# Patient Record
Sex: FEMALE | Race: ASIAN | NOT HISPANIC OR LATINO | URBAN - METROPOLITAN AREA
[De-identification: names, ages, dates, MRNs, and addresses within clinical notes are randomized per-mention and may not be internally consistent; named-entity substitution may affect disease eponyms.]

---

## 2017-05-11 ENCOUNTER — HOSPITAL ENCOUNTER (EMERGENCY)
Facility: OTHER | Age: 42
Discharge: HOME | End: 2017-05-11
Attending: EMERGENCY MEDICINE
Payer: MEDICAID

## 2017-05-11 VITALS
DIASTOLIC BLOOD PRESSURE: 97 MMHG | BODY MASS INDEX: 26.17 KG/M2 | OXYGEN SATURATION: 98 % | SYSTOLIC BLOOD PRESSURE: 150 MMHG | WEIGHT: 138.5 LBS | HEART RATE: 87 BPM | RESPIRATION RATE: 16 BRPM | TEMPERATURE: 98 F

## 2017-05-11 DIAGNOSIS — R06.02 SHORTNESS OF BREATH: ICD-10-CM

## 2017-05-11 DIAGNOSIS — R05.9 COUGH: ICD-10-CM

## 2017-05-11 DIAGNOSIS — J02.0 STREP PHARYNGITIS: ICD-10-CM

## 2017-05-11 DIAGNOSIS — J10.1 INFLUENZA B: Primary | ICD-10-CM

## 2017-05-11 LAB
BILIRUBIN, POC UA: NEGATIVE
BLOOD, POC UA: ABNORMAL
CLARITY, POC UA: CLEAR
COLOR, POC UA: YELLOW
GLUCOSE, POC UA: NEGATIVE
INFLUENZA A ANTIGEN, POC: NEGATIVE
INFLUENZA B ANTIGEN, POC: POSITIVE
KETONES, POC UA: NEGATIVE
LEUKOCYTE EST, POC UA: NEGATIVE
NITRITE, POC UA: NEGATIVE
PH UR STRIP: 7 [PH]
POC RAPID STREP A: POSITIVE
PROTEIN, POC UA: NEGATIVE
SPECIFIC GRAVITY, POC UA: 1.02
UROBILINOGEN, POC UA: 0.2 E.U./DL

## 2017-05-11 PROCEDURE — 93010 ELECTROCARDIOGRAM REPORT: CPT | Mod: ,,, | Performed by: INTERNAL MEDICINE

## 2017-05-11 PROCEDURE — 99284 EMERGENCY DEPT VISIT MOD MDM: CPT

## 2017-05-11 PROCEDURE — 93005 ELECTROCARDIOGRAM TRACING: CPT

## 2017-05-11 RX ORDER — AMOXICILLIN 500 MG/1
1000 TABLET, FILM COATED ORAL DAILY
Qty: 20 TABLET | Refills: 0 | Status: SHIPPED | OUTPATIENT
Start: 2017-05-11 | End: 2017-05-21

## 2017-05-11 RX ORDER — CODEINE PHOSPHATE AND GUAIFENESIN 10; 100 MG/5ML; MG/5ML
10 SOLUTION ORAL EVERY 4 HOURS PRN
Qty: 118 ML | Refills: 0 | Status: SHIPPED | OUTPATIENT
Start: 2017-05-11 | End: 2017-05-16

## 2017-05-11 NOTE — ED AVS SNAPSHOT
[facility] EMERGENCY DEPARTMENT  [address] [facility] [address]               Danya Garcia   2017  9:13 PM   ED    Description:  Female : 1975   Department:  [facility]           Your Care was Coordinated By:     Provider Role From To    Mariela Montelongo MD Attending Provider [phone] --      Reason for Visit     Shortness of Breath     Cough     Fever           Diagnoses this Visit        Comments    Influenza B    -  Primary     Shortness of breath         Cough         Strep pharyngitis           ED Disposition     ED Disposition Condition Comment    Discharge             To Do List           Follow-up Information     Follow up with Mehul Martinez MD In 3 days.    Specialty:  Pediatrics    Why:  For recheck if symptoms fail to improve or resolve    Contact information:    [address]  [place] [id number] [phone]         These Medications        Disp Refills Start End    amoxicillin (AMOXIL) 500 MG Tab 20 tablet 0 2017    Take 2 tablets (1,000 mg total) by mouth once daily. - Oral    Pharmacy: [address] Ph #: [phone]       guaifenesin-codeine 100-10 mg/5 ml (CHERATUSSIN AC)  mg/5 mL syrup 118 mL 0 2017    Take 10 mLs by mouth every 4 (four) hours as needed for Cough. - Oral    [address] #: [phone]         [facility]     [facility] Nurse Care Line - 24/7 Assistance  Unless otherwise directed by your provider, please contact [facility], our nurse care line that is available for 24/7 assistance.     Registered nurses in the [facility] provide: appointment scheduling, clinical advisement, health education, and other advisory services.  Call: [phone] (toll free)               Medications           Message regarding Medications      Verify the changes and/or additions to your medication regime listed below are the same as discussed with your clinician today.  If any of these changes or additions are incorrect, please notify your healthcare provider.        START taking these NEW medications        Refills    amoxicillin (AMOXIL) 500 MG Tab 0    Sig: Take 2 tablets (1,000 mg total) by mouth once daily.    Class: Print    Route: Oral    guaifenesin-codeine 100-10 mg/5 ml (CHERATUSSIN AC)  mg/5 mL syrup 0    Sig: Take 10 mLs by mouth every 4 (four) hours as needed for Cough.    Class: Print    Route: Oral      STOP taking these medications     hydrocodone-acetaminophen 5-325mg (NORCO) 5-325 mg per tablet Take 1 tablet by mouth every 6 (six) hours as needed for Pain.           Verify that the below list of medications is an accurate representation of the medications you are currently taking.  If none reported, the list may be blank. If incorrect, please contact your healthcare provider. Carry this list with you in case of emergency.           Current Medications     amoxicillin (AMOXIL) 500 MG Tab Take 2 tablets (1,000 mg total) by mouth once daily.    guaifenesin-codeine 100-10 mg/5 ml (CHERATUSSIN AC)  mg/5 mL syrup Take 10 mLs by mouth every 4 (four) hours as needed for Cough.    ibuprofen (ADVIL,MOTRIN) 200 MG tablet Take 200 mg by mouth every 6 (six) hours as needed for Pain.    ibuprofen (ADVIL,MOTRIN) 200 MG tablet Take 2 tablets (400 mg total) by mouth every 6 (six) hours as needed for Pain.           Clinical Reference Information           Your Vitals Were     BP Pulse Temp Resp Weight Last Period    150/97 (BP Location: Right arm, Patient Position: Sitting) 87 97.5 °F (36.4 °C) (Temporal) 16 62.8 kg (138 lb 8 oz) 05/07/2017    SpO2 BMI             98% 26.17 kg/m2         Allergies as of 5/11/2017        Reactions    Aleve [Naproxen Sodium] Hives    Shellfish Containing Products Rash      Immunizations Administered on Date  of Encounter - 5/11/2017     None      ED Micro, Lab, POCT     Start Ordered       Status Ordering Provider    05/11/17 2150 05/11/17 2150  POCT Influenza A/B  Once      Final result     05/11/17 2145 05/11/17 2145  POCT URINALYSIS W/O SCOPE  Once      Final result     05/11/17 2140 05/11/17 2140  POCT Rapid Strep A  Once      Final result     05/11/17 2127 05/11/17 2126  POCT Rapid Strep A  Once      Completed     05/11/17 2127 05/11/17 2126  POCT URINALYSIS W/O SCOPE  Once      Completed     05/11/17 2127 05/11/17 2126  POCT Influenza A/B  Once      Completed       ED Imaging Orders     Start Ordered       Status Ordering Provider    05/11/17 2126 05/11/17 2126  X-Ray Chest PA And Lateral  1 time imaging      Final result         Discharge Instructions         Tylenol or ibuprofen over-the-counter per package as needed for fever or aches.  Increase oral liquids for several days.  Amoxicillin 500 mg 2 tablets by mouth daily for 10 days.  Cheratussin AC 10 mL (2 teaspoons) by mouth every 6 hours as needed for cough.  Frequent handwashing for all household members.  Follow-up with Dr. Martinez if not improving in 3 days.     Viêm h?ng: Malorie?m C?u Khu?n (Xác Nh?n)     Quý v? ???c th? katie?m cho th?y c? h?ng b? malorie?m c?u khu?n. C? h?ng b? malorie?m c?u khu?n là m?t c?n b?nh hay lây. C?n b?nh này lây ena althea vi?c ho, hôn hít ho?c ??ng ch?m nh?ng ng??i khác jessica khi s? vào mi?ng ho?c m?i c?a quý v?. Các tri?u ch?ng rachana g?m ?au c? h?ng b? tr?m tr?ng h?n khi nu?t, ?au toàn thân, nh?c ??u và s?t. B?nh này ???c ?i?u tr? b?ng thu?c tr? sinh. Thu?c này s? giúp cho quý v? b?t ??u ?? h?n bear vòng 1-2 ngày.  Ch?m sóc t?i katherine  · Ngh? ng?i t?i katherine. U?ng th?t malorie?u ch?t l?ng ?? tránh b? háo n??c.  · Không ?i làm ho?c ?i h?c bear 2 ngày ??u jessica khi dùng thu?c tr? sinh. Jessica th?i emmanuel này, quý v? s? không lây cho ng??i khác. Jessica ?ó quý v? có th? tr? l?i ?i h?c ho?c ?i làm n?u c?m th?y ?? h?n.   · Thu?c tr? sinh ph?i ???c dùng bear tr?n 10  ngày, m?c dù quý v? c?m th?y ?? h?n. ?i?u này r?t marychuy tr?ng ?? ??m b?o là b? rayray?m trùng ???c ?i?u tr?. ?? ng?n ng?a s? phát tri?n c?a các vi khu?n kháng thu?c c?ng là ?i?u marychuy tr?ng.  N?u quý v? ???c chích thu?c tr? sinh, s? không còn c?n thêm thu?c tr? sinh n?a.  · Quý v? có th? dùng acetaminophen (Tylenol) ho?c ibuprofen (Motrin, Advil) ?? ki?m ch? c?n ?au ho?c s?t, tr? khi m?t thu?c khác ?ã ???c kê toa cho ch?ng này. (GHI CHÚ: N?u quý v? b? b?nh dionne ho?c th?n mãn tính ho?c ?ã t?ng b? loét rachana t? ho?c ch?y máu bear ???ng tiêu hoá, hãy bàn v?i bác s? c?a quý v? tr??c khi dùng các thu?c này.)  · Các viên thu?c hình thoi ho?c thu?c x?t cho c? h?ng, (nh? Chloraseptic) giúp làm gi?m c?n ?au. Xúc h?ng b?ng n??c ?m pha mu?i c?ng s? làm gi?m ch?ng ?au c? h?ng. Pha 1/2 mu?ng cà phê mu?i bear 1 ly n??c ?m. ?i?u này có th? h?u ích ngay tr??c các b?a ?n.   · ?n th?c ph?m m?m thì c?ng ???c. Tránh th?c ?n m?n ho?c geovani.  Ch?m sóc tonya dõi  Khám tonya dõi v?i nhân viên y t? c?a quý v? ho?c nhân viên c?a chúng tôi n?u quý v? không ?? h?n bear tu?n l? k? ti?p.  Khi nào ?i tìm s? khuyên nh? v? y t?  G?i nhân viên y t? c?a mình ngay n?u b? b?t c? ?i?u này genie ?ây x?y ra:  · B? s?t tonya s? ch? d?n c?a bác s? quý v?   · M?i b? ?au marilee ho?c ?au tr?m tr?ng h?n, ?au xoang m?i, ho?c nh?c ??u  · Các kh?i u ?au ??n n?i ? genie gáy  · C? c?ng  · Các h?ch b?ch adalberto?t s?ng l?n h?n ho?c tr? nên m?m ? chính gi?a  · Không th? nu?t ???c ch?t l?ng, ch?y n??c dãi quá ??, ho?c không th? m? mi?ng r?ng do ?au c? h?ng  · Các d?u hi?u b? háo n??c (n??c ti?u r?t ??m màu ho?c không có n??c ti?u, m?t b? s?p, chóng m?t)  · Khó th? ho?c th? có ti?ng ??ng  · Ti?ng nói b? gi?m ?i  · M?i phát ban  Date Last Reviewed: 4/13/2015  © 9694-3131 [employer]. [address] [place] [address], [place] [address]. All rights reserved. This information is not intended as a substitute for professional medical care. Always follow your healthcare professional's  instructions.          Discharge References/Attachments     VIRAL SYNDROME (ADULT) (Bangladeshi)      [facility] Sign-Up     Activating your [facility] account is as easy as 1-2-3!     1) Visit [email or web address], select Sign Up Now, enter this activation code and your date of birth, then select Next.  Activation code not generated  Current Patient Portal Status: Account disabled      2) Create a username and password to use when you visit [facility] in the future and select a security question in case you lose your password and select Next.    3) Enter your e-mail address and click Sign Up!    Additional Information  If you have questions, please e-mail [email or web address] or call [phone] to talk to our [facility] staff. Remember, [facility] is NOT to be used for urgent needs. For medical emergencies, dial 911.          [facility] Emergency Department complies with applicable Federal civil rights laws and does not discriminate on the basis of race, color, national origin, age, disability, or sex.        Language Assistance Services     ATTENTION: Language assistance services are available, free of charge. Please call [phone].      ATENCIÓN: Si habla español, tiene a slaughter disposición servicios gratuitos de asistencia lingüística. Llame al [phone].     CHÚ Ý: N?u b?n nói Ti?ng Vi?t, có các d?ch v? h? tr? ngôn ng? mi?n phí dành cho b?n. G?i s? [phone].

## 2017-05-12 NOTE — DISCHARGE INSTRUCTIONS
Tylenol or ibuprofen over-the-counter per package as needed for fever or aches.  Increase oral liquids for several days.  Amoxicillin 500 mg 2 tablets by mouth daily for 10 days.  Cheratussin AC 10 mL (2 teaspoons) by mouth every 6 hours as needed for cough.  Frequent handwashing for all household members.  Follow-up with Dr. Martinez if not improving in 3 days.     Viêm h?ng: Malorie?m C?u Khu?n (Xác Nh?n)     Quý v? ???c th? katie?m cho th?y c? h?ng b? malorie?m c?u khu?n. C? h?ng b? malorie?m c?u khu?n là m?t c?n b?nh hay lây. C?n b?nh này lây ena althea vi?c ho, hôn hít ho?c ??ng ch?m nh?ng ng??i khác jessica khi s? vào mi?ng ho?c m?i c?a quý v?. Các tri?u ch?ng rachana g?m ?au c? h?ng b? tr?m tr?ng h?n khi nu?t, ?au toàn thân, nh?c ??u và s?t. B?nh này ???c ?i?u tr? b?ng thu?c tr? sinh. Thu?c này s? giúp cho quý v? b?t ??u ?? h?n bear vòng 1-2 ngày.  Ch?m sóc t?i katherine  · Ngh? ng?i t?i katherine. U?ng th?t malorie?u ch?t l?ng ?? tránh b? háo n??c.  · Không ?i làm ho?c ?i h?c bear 2 ngày ??u jessica khi dùng thu?c tr? sinh. Jessica th?i emmanuel này, quý v? s? không lây cho ng??i khác. Jessica ?ó quý v? có th? tr? l?i ?i h?c ho?c ?i làm n?u c?m th?y ?? h?n.   · Thu?c tr? sinh ph?i ???c dùng bear tr?n 10 ngày, m?c dù quý v? c?m th?y ?? h?n. ?i?u này r?t marychuy tr?ng ?? ??m b?o là b? malorie?m trùng ???c ?i?u tr?. ?? ng?n ng?a s? phát tri?n c?a các vi khu?n kháng thu?c c?ng là ?i?u marychuy tr?ng.  N?u quý v? ???c chích thu?c tr? sinh, s? không còn c?n thêm thu?c tr? sinh n?a.  · Quý v? có th? dùng acetaminophen (Tylenol) ho?c ibuprofen (Motrin, Advil) ?? ki?m ch? c?n ?au ho?c s?t, tr? khi m?t thu?c khác ?ã ???c kê toa cho ch?ng này. (GHI CHÚ: N?u quý v? b? b?nh dionne ho?c th?n mãn tính ho?c ?ã t?ng b? loét rachana t? ho?c ch?y máu bear ???ng tiêu hoá, hãy bàn v?i bác s? c?a quý v? tr??c khi dùng các thu?c này.)  · Các viên thu?c hình thoi ho?c thu?c x?t cho c? h?ng, (nh? Chloraseptic) giúp làm gi?m c?n ?au. Xúc h?ng b?ng n??c ?m pha mu?i c?ng s? làm gi?m ch?ng ?au c? h?ng. Pha  1/2 mu?ng cà phê mu?i bear 1 ly n??c ?m. ?i?u này có th? h?u ích ngay tr??c các b?a ?n.   · ?n th?c ph?m m?m thì c?ng ???c. Tránh th?c ?n m?n ho?c geovani.  Ch?m sóc tonya dõi  Khám tonya dõi v?i nhân viên y t? c?a quý v? ho?c nhân viên c?a chúng tôi n?u quý v? không ?? h?n bear tu?n l? k? ti?p.  Khi nào ?i tìm s? khuyên nh? v? y t?  G?i nhân viên y t? c?a mình ngay n?u b? b?t c? ?i?u này genie ?ây x?y ra:  · B? s?t tonya s? ch? d?n c?a bác s? quý v?   · M?i b? ?au marilee ho?c ?au tr?m tr?ng h?n, ?au xoang m?i, ho?c nh?c ??u  · Các kh?i u ?au ??n n?i ? genie gáy  · C? c?ng  · Các h?ch b?ch adalberto?t s?ng l?n h?n ho?c tr? nên m?m ? chính gi?a  · Không th? nu?t ???c ch?t l?ng, ch?y n??c dãi quá ??, ho?c không th? m? mi?ng r?ng do ?au c? h?ng  · Các d?u hi?u b? háo n??c (n??c ti?u r?t ??m màu ho?c không có n??c ti?u, m?t b? s?p, chóng m?t)  · Khó th? ho?c th? có ti?ng ??ng  · Ti?ng nói b? gi?m ?i  · M?i phát ban  Date Last Reviewed: 4/13/2015  © 5892-4320 [employer]. [address] [place] [address], [place] [address]. All rights reserved. This information is not intended as a substitute for professional medical care. Always follow your healthcare professional's instructions.

## 2017-05-12 NOTE — ED PROVIDER NOTES
Encounter Date: 5/11/2017       History     Chief Complaint   Patient presents with    Shortness of Breath     symptoms since last night, states it feels like she can't breath, took Tylenol for fever    Cough    Fever     Review of patient's allergies indicates:   Allergen Reactions    Aleve [naproxen sodium] Hives    Shellfish containing products Rash     HPI Comments: 41-year-old [place] female reports cough productive of sputum, shortness of breath, back pain, body aches and fever greater than 100° for 2 days.    Patient is a 41 y.o. female presenting with the following complaint: shortness of breath.   Shortness of Breath   This is a new problem. The problem occurs intermittently.The current episode started 2 days ago. The problem has not changed since onset.Associated symptoms include a fever, sore throat, cough and sputum production. Pertinent negatives include no headaches, no rhinorrhea, no ear pain, no neck pain, no hemoptysis, no wheezing, no PND, no chest pain, no vomiting, no abdominal pain and no leg swelling.     No past medical history on file.  Past Surgical History:   Procedure Laterality Date    HEMORRHOID SURGERY N/A      History reviewed. No pertinent family history.  Social History   Substance Use Topics    Smoking status: Never Smoker    Smokeless tobacco: None    Alcohol use No     Review of Systems   Constitutional: Positive for fever. Negative for chills.   HENT: Positive for congestion and sore throat. Negative for ear pain, postnasal drip and rhinorrhea.    Respiratory: Positive for cough, sputum production and shortness of breath. Negative for hemoptysis and wheezing.    Cardiovascular: Negative for chest pain, leg swelling and PND.   Gastrointestinal: Negative for abdominal pain, nausea and vomiting.   Genitourinary: Negative for dysuria and frequency.   Musculoskeletal: Positive for back pain and myalgias. Negative for neck pain.   Skin: Negative.    Neurological: Negative  for weakness and headaches.       Physical Exam   Initial Vitals   BP Pulse Resp Temp SpO2   05/11/17 2019 05/11/17 2019 05/11/17 2019 05/11/17 2019 05/11/17 2019   150/97 87 16 97.5 °F (36.4 °C) 98 %     Physical Exam    Vitals reviewed.  Constitutional: She appears well-developed and well-nourished. She is cooperative.   HENT:   Head: Normocephalic and atraumatic.   Nose: No mucosal edema or rhinorrhea.   Mouth/Throat: Mucous membranes are normal. Posterior oropharyngeal erythema present. No oropharyngeal exudate.   Eyes: Conjunctivae and lids are normal.   Neck: Phonation normal. Neck supple. No spinous process tenderness and no muscular tenderness present.   Cardiovascular: Normal rate, regular rhythm and normal heart sounds.   Pulmonary/Chest: Effort normal and breath sounds normal.   Abdominal: Soft. Bowel sounds are normal. There is no tenderness.   Musculoskeletal:        Cervical back: She exhibits no tenderness and no bony tenderness.        Lumbar back: She exhibits no tenderness and no bony tenderness.        Back:    Neurological: She is alert and oriented to person, place, and time. Gait normal.   Skin: Skin is warm and dry.   Multiple round, ecchymotic areas to back consistent with patient reports of cupping         ED Course   Procedures  Labs Reviewed   POCT URINALYSIS W/O SCOPE   POCT STREP A, RAPID   POCT RAPID INFLUENZA A/B             Medical Decision Making:   Initial Assessment:   Cough, sore throat, body aches, myalgias  Differential Diagnosis:   Influenza, strep pharyngitis, viral upper respiratory infection, UTI  Clinical Tests:   Lab Tests: Ordered and Reviewed  The following lab test(s) were unremarkable: Urinalysis       <> Summary of Lab: Urinalysis showed trace intact blood but was otherwise normal  Rapid strep was positive.  Influenza screen was positive for influenza B    CXR:  FINDINGS:  The PA and lateral chest radiographs shows normal lung volumes without interstitial or    airspace opacities, pleural effusions or pneumothorax.     The heart size and pulmonary vasculature are normal. The trachea is midline. There are no   clinically significant osseous abnormalities noted.     IMPRESSION:  No chest radiographic evidence of acute cardiopulmonary abnormality.     SL: 24 Signed by: Ricardo Hernandez MD  2017-05-11 21:42:38  ED Management:  We will treat patient with amoxicillin for her strep pharyngitis, Cheratussin AC for cough, and recommend Tylenol or ibuprofen (which she is known tolerate) for pain and fever.  Results and plan of care discussed with patient, who voiced understanding.                   ED Course     Clinical Impression:   The primary encounter diagnosis was Influenza B. Diagnoses of Shortness of breath, Cough, and Strep pharyngitis were also pertinent to this visit.    Disposition:   Disposition: Discharged  Condition: Stable       Mariela Montelongo MD  05/11/17 0605

## 2017-09-22 ENCOUNTER — OFFICE VISIT (OUTPATIENT)
Dept: URGENT CARE | Facility: CLINIC | Age: 42
End: 2017-09-22
Payer: MEDICAID

## 2017-09-22 VITALS
BODY MASS INDEX: 24.55 KG/M2 | WEIGHT: 130 LBS | OXYGEN SATURATION: 97 % | SYSTOLIC BLOOD PRESSURE: 129 MMHG | TEMPERATURE: 100 F | RESPIRATION RATE: 16 BRPM | DIASTOLIC BLOOD PRESSURE: 82 MMHG | HEART RATE: 72 BPM | HEIGHT: 61 IN

## 2017-09-22 DIAGNOSIS — H11.31 SUBCONJUNCTIVAL HEMORRHAGE OF RIGHT EYE: ICD-10-CM

## 2017-09-22 DIAGNOSIS — K52.9 GASTROENTERITIS: ICD-10-CM

## 2017-09-22 DIAGNOSIS — R11.2 NAUSEA AND VOMITING, INTRACTABILITY OF VOMITING NOT SPECIFIED, UNSPECIFIED VOMITING TYPE: Primary | ICD-10-CM

## 2017-09-22 PROCEDURE — 3008F BODY MASS INDEX DOCD: CPT | Mod: S$GLB,,,

## 2017-09-22 PROCEDURE — 99214 OFFICE O/P EST MOD 30 MIN: CPT | Mod: S$GLB,,,

## 2017-09-22 RX ORDER — PROMETHAZINE HYDROCHLORIDE 25 MG/1
25 TABLET ORAL EVERY 4 HOURS
Qty: 15 TABLET | Refills: 0 | Status: SHIPPED | OUTPATIENT
Start: 2017-09-22

## 2017-09-22 RX ORDER — IBUPROFEN 800 MG/1
800 TABLET ORAL 3 TIMES DAILY
COMMUNITY
End: 2018-05-03 | Stop reason: DRUGHIGH

## 2017-09-22 NOTE — PROGRESS NOTES
"Subjective:       Patient ID: Danya Garcia is a 41 y.o. female.    Vitals:  height is 5' 1" (1.549 m) and weight is 59 kg (130 lb). Her tympanic temperature is 99.5 °F (37.5 °C). Her blood pressure is 129/82 and her pulse is 72. Her respiration is 16 and oxygen saturation is 97%.     Chief Complaint: Emesis    Emesis    This is a new problem. The current episode started yesterday. The problem occurs 2 to 4 times per day. The problem has been gradually worsening. The emesis has an appearance of stomach contents. There has been no fever. Associated symptoms include abdominal pain and headaches. Pertinent negatives include no chest pain, chills, diarrhea or fever. Risk factors include ill contacts. She has tried nothing for the symptoms.   Patient had vomited 4 times last pm.  Weak, Has generalized headaches.  Also myalgias.    Review of Systems   Constitution: Negative for chills and fever.   HENT: Negative for sore throat.    Eyes: Positive for blurred vision (right eye) and pain (right eye).   Cardiovascular: Negative for chest pain.   Respiratory: Negative for shortness of breath.    Skin: Negative for rash.   Musculoskeletal: Negative for back pain and joint pain.   Gastrointestinal: Positive for abdominal pain and vomiting. Negative for diarrhea and nausea.   Neurological: Positive for headaches.   Psychiatric/Behavioral: The patient is not nervous/anxious.        Objective:      Physical Exam   Constitutional: She is oriented to person, place, and time. She appears well-developed and well-nourished. No distress (Patient is a little droopy appearing.  ).   HENT:   Mouth/Throat: Oropharynx is clear and moist.   Eyes:   Right lateral conjunctiva has a hemorrhage.  Vis Acuity is normal.    Abdominal: Soft. Bowel sounds are normal. She exhibits no distension. There is tenderness (Minimal tenderness lower quadrants bilaterally. ). There is no rebound.   Neurological: She is alert and oriented to person, place, and " time.   Vitals reviewed.      Assessment:       1.  Gastroenteritis  2.  Nausea and vomiting  3.  Right subconjunctival hemorrhage.    Plan:         There are no diagnoses linked to this encounter.   Phenergan 25 mg tabs q 6h.  Clear fluids.  Reassured re; right eye.   No intervention needed.

## 2017-09-22 NOTE — PATIENT INSTRUCTIONS
"I encourage a lot of clear liquids for the next day or more.     Nôn m?a và tiêu ch?y, không c? th? (Ng??i l?n)    Nôn m?a và tiêu ch?y giúp c? th? th?i b? các ch?t có h?i. Nguyên nhân có th? là do vi-rút ("cúm rachana t? (stomach flue)") ho?c vi khu?n (ng? ??c th?c ph?m) D? ?ng th?c ph?m ho?c thu?c c?ng có th? gây tri?u ch?ng nôn m?a ho?c tiêu ch?y. Th?nh tho?ng nguyên nhân c?ng có th? là do c?ng th?ng ho?c lo l?ng (b?n ch?n) rayray?u. Th??ng khó ch? rõ ???c nguyên nhân chính xác, k? c? genie khi ki?m tra.  Nôn m?a và tiêu ch?y th??ng h?t bear m?t karena ngày mà không gây ra v?n ?? gì. Nh?ng còn ti?p t?c, chúng có th? d?n ??n m?t n??c (m?t l??ng ch?t l?ng quá rayray?u). ?i?u này có th? nghiêm tr?ng n?u không ???c ?i?u tr?.  Ch?m sóc t?i katherine  Th?n tr?ng v?i thu?c.   Hãy h?i nhân viên y t? c?a quý v? tr??c khi dùng thu?c ch?ng bu?n nôn ho?c tiêu ch?y. C? th? th??ng s? d?ng tiêu ch?y và nôn m?a ?? lo?i b? các ch?t có th? ??c ho?c gây h?i. Vì th?, thu?c làm ch?m hay ng?n c?n quá trình này có th? không ???c khuy?n cáo. M?t s? lo?i thu?c bán althea qu?y có th? giúp xoa d?u c?m giác t?c b?ng. Hãy h?i nhân viên y t? xem các lo?i thu?c này có th? giúp quý v? hay không.  U?ng ho?c nh?p ch?t l?ng ?? tránh m?t n??c.   · N?u quý v? b? m?t n??c, bác s? có th? khuy?n cáo dùng dung d?ch bù n??c u?ng (còn ???c bi?t ??n nh? là ORS). Pedialyte và Rehydralyte là các th??ng hi?u thông d?ng. Có th? keyon karena lo?i thu?c này mà không c?n kê ??n t?i c?a hàng t?p ph?m và hi?u thu?c. Ch? s? d?ng ORS ?ã ???c ?i?u ch?. Không ???c c? t? t?o dung d?ch. H?n h?p mu?i, n??c và ch?t ?i?n gi?i là t?t nh?t ?? thay th? cho ch? ch?t l?ng mà quý v? ?ã m?t khi b? ?m. N?u quý v? không tìm ???c dung d?ch ORS, quý v? có th? s? d?ng Gatorade pha loãng b?ng n??c v?i l??ng b?ng nhau (m?t c?c Gatorade pha v?i m?t c?c n??c) ?? thay th?.  · Th??ng xuyên nh?p vài ng?m nh? ?? tránh tình tr?ng m?t n??c. N?u quý v? d?ng nôn m?a, quý v? có th? b? sung thêm ch? ?? ?n c?a dmitry padilla " m?t s? lo?i có th? khi?n tiêu ch?y tr?m tr?ng h?n.  · N?u ngh? t?i vi?c u?ng th? gì ?ó khi?n quý v? th?y bu?n nôn h?n, hãy ng?m m?t viên ?á.  · Tránh ?? u?ng gây d? ?ng ch?ng h?n nh? n??c ép trái cây và xô-?a. Chúng có th? khi?n tình tr?ng tiêu ch?y tr? nên t? h?n.  T? t? ava tr? l?i ch? ?? ?n bình th??ng c?a quý v?.   Khi c?m giác thèm ?n c?a quý v? tr? l?i, hãy ?n nh?ng th? mà quý v? th?y thích. Quý v? không c?n ?n các lo?i th?c ?n ??c bi?t. Ban ??u hãy tránh th?c ?n rayray?u m? ho?c s?a bò. ??ng ?n quá rayray?u m?t lúc. Quý v? có th? ???c khuyên nên tránh m?t s? lo?i th?c ?n t?i khi c?m th?y khá h?n.  R?a etelvina.  Th??ng xuyên r?a etelvina có th? giúp ng?n ng?a rayray?m trùng lây ena.  Dakota dõi  nh? ???c ch? d?n b?i bác s? ho?c nhân viên c?a randall tôi.  Tìm s? marychuy tâm y t? ngay l?p t?c  n?u b?t k? ?i?u nào genie ?ây x?y ra:  · Nôn ho?c ?i phân có máu ho?c màu ?en.  · ?au b?ng n?ng, ??u không d?t ho?c ?au b?ng có d?u hi?u x?u ?i.  · ?au ??u n?ng ho?c tê c?ng c?  · Không có kh? n?ng u?ng ???c dù ch? m?t chút ch?t l?ng bear lâu h?n 12 gi?.  · Nôn m?a kéo dài h?n 24 gi?.  · Tiêu ch?y kéo dài h?n 24 gi?.  · S?t 100,4ºF (38ºC) ho?c rduolph h?n kéo dài bear lâu h?n 48 gi? ho?c dakota ch? d?n c?a nhân viên y t? c?a quý v?  · Da h?i vàng ho?c m?t có màu tr?ng.  · Các d?u hi?u m?t n??c, ch?ng h?n nh? khô mi?ng, ?i ti?u ít (ít h?n 6 ti?ng m?t l?n) ho?c n??c ti?u có màu r?t s?m.  · C?m th?y r?t y?u, chóng m?t ho?c lâng lâng bear ??u  Date Last Reviewed: 1/3/2016  © 7018-1865 [employer]. [address] [place] [address], [place] [address]. All rights reserved. This information is not intended as a substitute for professional medical care. Always follow your healthcare professional's instructions.

## 2018-05-03 ENCOUNTER — HOSPITAL ENCOUNTER (EMERGENCY)
Facility: HOSPITAL | Age: 43
Discharge: HOME | End: 2018-05-03
Attending: EMERGENCY MEDICINE
Payer: MEDICAID

## 2018-05-03 VITALS
TEMPERATURE: 99 F | RESPIRATION RATE: 16 BRPM | HEART RATE: 91 BPM | HEIGHT: 61 IN | WEIGHT: 140 LBS | DIASTOLIC BLOOD PRESSURE: 84 MMHG | SYSTOLIC BLOOD PRESSURE: 128 MMHG | BODY MASS INDEX: 26.43 KG/M2 | OXYGEN SATURATION: 97 %

## 2018-05-03 DIAGNOSIS — M75.32 CALCIFIC TENDONITIS OF LEFT SHOULDER: Primary | ICD-10-CM

## 2018-05-03 PROCEDURE — 63600175 PHARM REV CODE 636 W HCPCS: Performed by: EMERGENCY MEDICINE

## 2018-05-03 PROCEDURE — 96372 THER/PROPH/DIAG INJ SC/IM: CPT

## 2018-05-03 PROCEDURE — 99283 EMERGENCY DEPT VISIT LOW MDM: CPT | Mod: 25

## 2018-05-03 RX ORDER — DEXAMETHASONE SODIUM PHOSPHATE 4 MG/ML
6 INJECTION, SOLUTION INTRA-ARTICULAR; INTRALESIONAL; INTRAMUSCULAR; INTRAVENOUS; SOFT TISSUE
Status: COMPLETED | OUTPATIENT
Start: 2018-05-03 | End: 2018-05-03

## 2018-05-03 RX ORDER — MELOXICAM 7.5 MG/1
7.5 TABLET ORAL DAILY
Qty: 10 TABLET | Refills: 1 | Status: SHIPPED | OUTPATIENT
Start: 2018-05-03 | End: 2018-05-13

## 2018-05-03 RX ADMIN — DEXAMETHASONE SODIUM PHOSPHATE 6 MG: 4 INJECTION, SOLUTION INTRAMUSCULAR; INTRAVENOUS at 01:05

## 2018-05-03 NOTE — ED PROVIDER NOTES
Encounter Date: 5/3/2018    SCRIBE #1 NOTE: I, Aj Gonzales, am scribing for, and in the presence of,  Dr. Stevenson. I have scribed the entire note.       History     Chief Complaint   Patient presents with    Shoulder Pain     left shoulder pain and seen at a doctor on tues but still having pain after 800mg Advil and muscle relaxer this AM      This is a 42 y.o. Female who presents with left arm pain for 2 weeks. She notes she saw her doctor for this 2 days ago, and was prescribed 800 mg ibuprofen QID and a muscle relaxer. She notes this does not help alleviate her pain. She notes her pain started in her finger and has now moved up her entire arm. She denies any recent trauma or injury. Her pain is exacerbated by movmeent. She denies any fever, rashes, numbness, or weakness. She notes 2 years ago she had similar symptoms that resolved on their own.           Review of patient's allergies indicates:   Allergen Reactions    Aleve [naproxen sodium] Hives    Shellfish containing products Rash     No past medical history on file.  Past Surgical History:   Procedure Laterality Date    HEMORRHOID SURGERY N/A      No family history on file.  Social History   Substance Use Topics    Smoking status: Never Smoker    Smokeless tobacco: Never Used    Alcohol use No     Review of Systems   Constitutional: Negative for fever.   Gastrointestinal: Negative for nausea and vomiting.   Musculoskeletal: Positive for myalgias. Negative for back pain, joint swelling and neck pain.   Skin: Negative for rash.   Neurological: Negative for weakness and numbness.       Physical Exam     Initial Vitals [05/03/18 1240]   BP Pulse Resp Temp SpO2   128/84 91 16 98.5 °F (36.9 °C) 97 %      MAP       98.67         Physical Exam    Nursing note and vitals reviewed.  Constitutional: She appears well-developed and well-nourished. She is not diaphoretic. No distress.   HENT:   Head: Normocephalic and atraumatic.   Eyes: EOM are normal. Pupils  are equal, round, and reactive to light.   Neck: Normal range of motion. Neck supple.   Cardiovascular: Normal rate, regular rhythm and normal heart sounds. Exam reveals no friction rub.    No murmur heard.  Pulmonary/Chest: Breath sounds normal. She has no wheezes. She has no rhonchi. She has no rales.   Abdominal: Soft. There is no tenderness. There is no rebound and no guarding.   Musculoskeletal: Normal range of motion. She exhibits tenderness. She exhibits no edema.   Pain on external rotation of left shoulder. TTP over bicep tendon groove. Spasm over trapezius. Radial pulse 2+.   Neurological: She is alert and oriented to person, place, and time.   Skin: Skin is warm and dry. Capillary refill takes less than 2 seconds.         ED Course   Procedures  Labs Reviewed - No data to display          Medical Decision Making:   Clinical Tests:   Radiological Study: Ordered and Reviewed            Scribe Attestation:   Scribe #1: I performed the above scribed service and the documentation accurately describes the services I performed. I attest to the accuracy of the note.    Attending Attestation:           Physician Attestation for Scribe:  Physician Attestation Statement for Scribe #1: I, Dr. Stevenson, reviewed documentation, as scribed by Aj Gonzales in my presence, and it is both accurate and complete.                    Clinical Impression:   No diagnosis found.               2:25 PM    ED Course:    Labs Reviewed - No data to display    Imaging Results          X-Ray Shoulder 2 or More Views Left (Final result)  Result time 05/03/18 13:43:18    Final result by Katharine William MD (05/03/18 13:43:18)                 Impression:      There is calcific tendinitis of the left supraspinatus tendon.      Electronically signed by: Katharine William MD  Date:    05/03/2018  Time:    13:43             Narrative:    EXAMINATION:  XR SHOULDER COMPLETE 2 OR MORE VIEWS LEFT    CLINICAL HISTORY:  pain;    TECHNIQUE:  Two or  three views of the left shoulder were performed.    COMPARISON:  None    FINDINGS:  There is calcification of the distal left supraspinatus tendon.  There is no evidence fracture or malalignment.  The adjacent soft tissues are unremarkable.                               X-Ray Cervical Spine AP Lat with Flexion Extension (Final result)  Result time 05/03/18 13:41:22    Final result by Katharine William MD (05/03/18 13:41:22)                 Impression:      There is no evidence of acute cervical spine injury.  The      Electronically signed by: Katharine William MD  Date:    05/03/2018  Time:    13:41             Narrative:    EXAMINATION:  XR CERVICAL SPINE AP LAT WITH FLEX EXTEN    CLINICAL HISTORY:  pain;    TECHNIQUE:  Three views of the cervical spine plus flexion and extension views were performed.    COMPARISON:  None    FINDINGS:  There is normal alignment of the cervical spine.  There is no overhang of the lateral masses.  The adjacent soft tissues are unremarkable.                                  Medications   dexamethasone injection 6 mg (6 mg Intramuscular Given 5/3/18 1340)       Temp:  [98.5 °F (36.9 °C)] 98.5 °F (36.9 °C)  Pulse:  [91] 91  Resp:  [16] 16  SpO2:  [97 %] 97 %  BP: (128)/(84) 128/84    REASSESSMENT:  Improved    IMP: shoulder tendinitis      PLAN: rom, nsaids, fup pcp                 Ivan Stevenson MD  05/03/18 3517

## 2018-10-13 ENCOUNTER — HOSPITAL ENCOUNTER (EMERGENCY)
Facility: HOSPITAL | Age: 43
Discharge: HOME | End: 2018-10-13
Attending: EMERGENCY MEDICINE
Payer: MEDICAID

## 2018-10-13 VITALS
WEIGHT: 140 LBS | HEIGHT: 61 IN | BODY MASS INDEX: 26.43 KG/M2 | RESPIRATION RATE: 16 BRPM | SYSTOLIC BLOOD PRESSURE: 123 MMHG | OXYGEN SATURATION: 99 % | TEMPERATURE: 98 F | HEART RATE: 86 BPM | DIASTOLIC BLOOD PRESSURE: 71 MMHG

## 2018-10-13 DIAGNOSIS — R53.83 FATIGUE, UNSPECIFIED TYPE: ICD-10-CM

## 2018-10-13 DIAGNOSIS — R07.9 CHEST PAIN: ICD-10-CM

## 2018-10-13 DIAGNOSIS — R51.9 NONINTRACTABLE HEADACHE, UNSPECIFIED CHRONICITY PATTERN, UNSPECIFIED HEADACHE TYPE: ICD-10-CM

## 2018-10-13 DIAGNOSIS — M25.512 LEFT SHOULDER PAIN, UNSPECIFIED CHRONICITY: Primary | ICD-10-CM

## 2018-10-13 LAB
ALBUMIN SERPL BCP-MCNC: 3.7 G/DL
ALP SERPL-CCNC: 56 U/L
ALT SERPL W/O P-5'-P-CCNC: 16 U/L
ANION GAP SERPL CALC-SCNC: 9 MMOL/L
AST SERPL-CCNC: 19 U/L
B-HCG UR QL: NEGATIVE
BASOPHILS # BLD AUTO: 0.08 K/UL
BASOPHILS NFR BLD: 1 %
BILIRUB SERPL-MCNC: 0.3 MG/DL
BNP SERPL-MCNC: 18 PG/ML
BUN SERPL-MCNC: 11 MG/DL
CALCIUM SERPL-MCNC: 9 MG/DL
CHLORIDE SERPL-SCNC: 104 MMOL/L
CO2 SERPL-SCNC: 29 MMOL/L
CREAT SERPL-MCNC: 0.8 MG/DL
CTP QC/QA: YES
DIFFERENTIAL METHOD: ABNORMAL
EOSINOPHIL # BLD AUTO: 0.2 K/UL
EOSINOPHIL NFR BLD: 3 %
ERYTHROCYTE [DISTWIDTH] IN BLOOD BY AUTOMATED COUNT: 12.6 %
EST. GFR  (AFRICAN AMERICAN): >60 ML/MIN/1.73 M^2
EST. GFR  (NON AFRICAN AMERICAN): >60 ML/MIN/1.73 M^2
GLUCOSE SERPL-MCNC: 132 MG/DL
HCT VFR BLD AUTO: 37.4 %
HGB BLD-MCNC: 12.5 G/DL
LYMPHOCYTES # BLD AUTO: 2.5 K/UL
LYMPHOCYTES NFR BLD: 32 %
MAGNESIUM SERPL-MCNC: 2.4 MG/DL
MCH RBC QN AUTO: 30.2 PG
MCHC RBC AUTO-ENTMCNC: 33.4 G/DL
MCV RBC AUTO: 90 FL
MONOCYTES # BLD AUTO: 0.4 K/UL
MONOCYTES NFR BLD: 5.1 %
NEUTROPHILS # BLD AUTO: 4.7 K/UL
NEUTROPHILS NFR BLD: 58.9 %
PLATELET # BLD AUTO: 371 K/UL
PMV BLD AUTO: 9.9 FL
POTASSIUM SERPL-SCNC: 4 MMOL/L
PROT SERPL-MCNC: 7.4 G/DL
RBC # BLD AUTO: 4.14 M/UL
SODIUM SERPL-SCNC: 142 MMOL/L
TROPONIN I SERPL DL<=0.01 NG/ML-MCNC: <0.006 NG/ML
TSH SERPL DL<=0.005 MIU/L-ACNC: 0.82 UIU/ML
WBC # BLD AUTO: 7.91 K/UL

## 2018-10-13 PROCEDURE — 93010 ELECTROCARDIOGRAM REPORT: CPT | Mod: ,,, | Performed by: INTERNAL MEDICINE

## 2018-10-13 PROCEDURE — 81025 URINE PREGNANCY TEST: CPT | Performed by: EMERGENCY MEDICINE

## 2018-10-13 PROCEDURE — 96361 HYDRATE IV INFUSION ADD-ON: CPT

## 2018-10-13 PROCEDURE — 84484 ASSAY OF TROPONIN QUANT: CPT

## 2018-10-13 PROCEDURE — 25000003 PHARM REV CODE 250: Performed by: NURSE PRACTITIONER

## 2018-10-13 PROCEDURE — 96372 THER/PROPH/DIAG INJ SC/IM: CPT | Mod: 59

## 2018-10-13 PROCEDURE — 63600175 PHARM REV CODE 636 W HCPCS: Performed by: EMERGENCY MEDICINE

## 2018-10-13 PROCEDURE — 93005 ELECTROCARDIOGRAM TRACING: CPT

## 2018-10-13 PROCEDURE — 84443 ASSAY THYROID STIM HORMONE: CPT

## 2018-10-13 PROCEDURE — 96374 THER/PROPH/DIAG INJ IV PUSH: CPT

## 2018-10-13 PROCEDURE — 99285 EMERGENCY DEPT VISIT HI MDM: CPT | Mod: 25

## 2018-10-13 PROCEDURE — 80053 COMPREHEN METABOLIC PANEL: CPT

## 2018-10-13 PROCEDURE — 85025 COMPLETE CBC W/AUTO DIFF WBC: CPT

## 2018-10-13 PROCEDURE — 25000003 PHARM REV CODE 250: Performed by: EMERGENCY MEDICINE

## 2018-10-13 PROCEDURE — 96375 TX/PRO/DX INJ NEW DRUG ADDON: CPT

## 2018-10-13 PROCEDURE — 83880 ASSAY OF NATRIURETIC PEPTIDE: CPT

## 2018-10-13 PROCEDURE — 83735 ASSAY OF MAGNESIUM: CPT

## 2018-10-13 RX ORDER — DIPHENHYDRAMINE HYDROCHLORIDE 50 MG/ML
25 INJECTION INTRAMUSCULAR; INTRAVENOUS
Status: COMPLETED | OUTPATIENT
Start: 2018-10-13 | End: 2018-10-13

## 2018-10-13 RX ORDER — CETIRIZINE HYDROCHLORIDE 10 MG/1
10 TABLET ORAL DAILY
COMMUNITY

## 2018-10-13 RX ORDER — ASPIRIN 325 MG
325 TABLET ORAL
Status: COMPLETED | OUTPATIENT
Start: 2018-10-13 | End: 2018-10-13

## 2018-10-13 RX ORDER — PROCHLORPERAZINE EDISYLATE 5 MG/ML
5 INJECTION INTRAMUSCULAR; INTRAVENOUS
Status: COMPLETED | OUTPATIENT
Start: 2018-10-13 | End: 2018-10-13

## 2018-10-13 RX ORDER — CYCLOBENZAPRINE HCL 10 MG
5 TABLET ORAL 3 TIMES DAILY PRN
Qty: 15 TABLET | Refills: 0 | Status: SHIPPED | OUTPATIENT
Start: 2018-10-13 | End: 2018-10-18

## 2018-10-13 RX ORDER — PAROXETINE 30 MG/1
30 TABLET, FILM COATED ORAL EVERY MORNING
COMMUNITY

## 2018-10-13 RX ORDER — DEXAMETHASONE SODIUM PHOSPHATE 4 MG/ML
4 INJECTION, SOLUTION INTRA-ARTICULAR; INTRALESIONAL; INTRAMUSCULAR; INTRAVENOUS; SOFT TISSUE
Status: COMPLETED | OUTPATIENT
Start: 2018-10-13 | End: 2018-10-13

## 2018-10-13 RX ADMIN — DEXAMETHASONE SODIUM PHOSPHATE 4 MG: 4 INJECTION, SOLUTION INTRAMUSCULAR; INTRAVENOUS at 06:10

## 2018-10-13 RX ADMIN — DIPHENHYDRAMINE HYDROCHLORIDE 25 MG: 50 INJECTION, SOLUTION INTRAMUSCULAR; INTRAVENOUS at 04:10

## 2018-10-13 RX ADMIN — ASPIRIN 325 MG ORAL TABLET 325 MG: 325 PILL ORAL at 03:10

## 2018-10-13 RX ADMIN — PROCHLORPERAZINE EDISYLATE 5 MG: 5 INJECTION INTRAMUSCULAR; INTRAVENOUS at 04:10

## 2018-10-13 RX ADMIN — SODIUM CHLORIDE 1000 ML: 0.9 INJECTION, SOLUTION INTRAVENOUS at 04:10

## 2018-10-13 NOTE — ED PROVIDER NOTES
"Encounter Date: 10/13/2018  SORT MSE:  Pt is a 42 y.o. female who presents for emergent consideration for chest pain. Pt will be moved to room when one is available, otherwise will wait in waiting room with triage nurse supervision.  Pt arrived by ambulatory. She is not in distress. Orders have been placed. ANDREW Barbosa DNP ACNP-BC 10/13/2018 3:28 PM     SCRIBE #1 NOTE: I, Bonnie Mcarthur, am scribing for, and in the presence of,  Laurie Blair MD. I have scribed the following portions of the note - Other sections scribed: HPI, ROS.       History     Chief Complaint   Patient presents with    Chest Pain     pt here for chest pain x1 week. wraps around to back.     Shoulder Pain     left shoulder pain.    Weakness     "I feel weak. I don't have energy anymore"     Headache     CC: Chest Pain, Left Shoulder Pain, Weakness, Headache    41 y/o female with no known PMHx presents to ED c/o chest pain, L shoulder pain, numbness and weakness to her R hand, dizziness, fatigue, sinus pain and frontal headache that began 2 days ago and worsened today.  Pt reports she had several vomiting episodes about one week ago that resolved on its own.  Pt states she has bilateral shoulder and arm pain when she wakes up, but that a Italian remedy resolved the pain on her R side (coining).  She states she is still having severe (8/10) to her L shoulder that worsens with palpation and movement of her L arm and that this pain extends to the upper part of her chest.  She also complains of a frontal headache today.  She has had headaches like this in the past.  She states she feels numbness and weakness to her R hand.  She also notes she "feels hot on the inside."  She states she has taken Zyrtec for her sinus symptoms without relief.  She has been taking 800 mg of ibuprofen without relief and her left shoulder pain Pt denies fever, chills, cough, abdominal pain, and leg pain.  No other symptoms reported.      The history is provided " by the patient and the spouse. No  was used.     Review of patient's allergies indicates:   Allergen Reactions    Aleve [naproxen sodium] Hives    Shellfish containing products Rash     Past Medical History:   Diagnosis Date    Depression      Past Surgical History:   Procedure Laterality Date    HEMORRHOID SURGERY N/A      History reviewed. No pertinent family history.  Social History     Tobacco Use    Smoking status: Never Smoker    Smokeless tobacco: Never Used   Substance Use Topics    Alcohol use: No    Drug use: No     Review of Systems   Constitutional: Positive for fatigue. Negative for chills and fever.   HENT: Positive for sinus pressure, sinus pain and sneezing. Negative for dental problem and facial swelling.    Eyes: Negative for pain.   Respiratory: Negative for cough and shortness of breath.    Cardiovascular: Positive for chest pain. Negative for palpitations.   Gastrointestinal: Negative for abdominal pain, diarrhea, nausea and vomiting.   Genitourinary: Negative for dysuria and urgency.   Musculoskeletal: Negative for back pain and neck pain.        (+) L shoulder pain  (+) L arm pain  (+) R lower pain pain  (+) R hand pain   Neurological: Positive for dizziness, weakness (R hand), numbness (R hand) and headaches. Negative for seizures and syncope.       Physical Exam     Initial Vitals [10/13/18 1527]   BP Pulse Resp Temp SpO2   139/77 74 18 98.1 °F (36.7 °C) 99 %      MAP       --         Physical Exam    Constitutional: She appears well-developed and well-nourished. She is not diaphoretic. No distress.   HENT:   Mouth/Throat: Oropharynx is clear and moist.   Eyes: EOM are normal. Pupils are equal, round, and reactive to light.   Neck: Neck supple.   Cardiovascular: Normal rate and regular rhythm.   Pulses:       Radial pulses are 2+ on the right side, and 2+ on the left side.   Pulmonary/Chest: Breath sounds normal. She has no wheezes. She has no rhonchi. She has no  rales. She exhibits tenderness (Tender to palpation across anterior upper chest wall on the left side.).   Abdominal: Soft. Bowel sounds are normal. There is no tenderness.   Musculoskeletal:        Left shoulder: She exhibits decreased range of motion (Pain with abduction.  Patient is able to externally and internally rotate) and tenderness. She exhibits no bony tenderness, no swelling and no deformity.   Tenderness to palpation over the left shoulder.   Neurological: She is alert and oriented to person, place, and time. She has normal strength. A sensory deficit ( isolated decreased sensation to light touch in the right hand on the thumb and pointer finger, this numbness does not extend proximally to the wrist.  Otherwise her sensation to light touch is intact in all 4 extremities.) is present. No cranial nerve deficit. GCS score is 15. GCS eye subscore is 4. GCS verbal subscore is 5. GCS motor subscore is 6.   No pronator drift, normal finger to nose testing, the no truncal ataxia, no nystagmus   Skin: Skin is warm and dry.   Psychiatric: She has a normal mood and affect.         ED Course   Procedures  Labs Reviewed   CBC W/ AUTO DIFFERENTIAL - Abnormal; Notable for the following components:       Result Value    Platelets 371 (*)     All other components within normal limits   COMPREHENSIVE METABOLIC PANEL - Abnormal; Notable for the following components:    Glucose 132 (*)     All other components within normal limits   TROPONIN I   B-TYPE NATRIURETIC PEPTIDE   TSH   MAGNESIUM   POCT URINE PREGNANCY        ECG Results          EKG 12-lead (Preliminary result)  Result time 10/13/18 18:03:10    ED Interpretation by Laurie Blair MD (10/13/18 18:03:10)    Normal sinus rhythm, rate 78 beats per minute, no ST elevation, no T-wave inversion, normal CA interval, .                            Imaging Results          CT Head Without Contrast (Final result)  Result time 10/13/18 17:29:29    Final result by  Jose Catalan MD (10/13/18 17:29:29)                 Impression:      No acute intracranial abnormalities identified.      Electronically signed by: Jose Catalan MD  Date:    10/13/2018  Time:    17:29             Narrative:    EXAMINATION:  CT HEAD WITHOUT CONTRAST    CLINICAL HISTORY:  Headache, acute, norm neuro exam;    TECHNIQUE:  Low dose axial images were obtained through the head.  Coronal and sagittal reformations were also performed. Contrast was not administered.    COMPARISON:  None.    FINDINGS:  The brain is normally formed and exhibits normal density throughout with no indication of acute/recent major vascular distribution cerebral infarction, intraparenchymal hemorrhage, or intra-axial space occupying lesion. The ventricular system is normal in size and configuration with no evidence of hydrocephalus. No effacement of the skull-base cisterns. No abnormal extra-axial fluid collections or blood products. The visualized paranasal sinuses and mastoid air cells are clear. The calvarium shows no significant abnormality.                               X-Ray Chest PA And Lateral (Final result)  Result time 10/13/18 16:26:34    Final result by Garret Araujo MD (10/13/18 16:26:34)                 Impression:      Normal radiographic appearance of the chest.      Electronically signed by: Garret Araujo MD  Date:    10/13/2018  Time:    16:26             Narrative:    EXAMINATION:  XR CHEST PA AND LATERAL    CLINICAL HISTORY:  Chest pain, unspecified    TECHNIQUE:  PA and lateral views of the chest were performed.    COMPARISON:  Chest radiograph 05/11/2017    FINDINGS:  Monitoring leads overlie the chest.  No detrimental change.The lungs are clear, with normal appearance of pulmonary vasculature and no pleural effusion or pneumothorax.    The cardiac silhouette is normal in size. The hilar and mediastinal contours are unremarkable.    Bones are intact.                                 Medical Decision  Making:   Initial Assessment:   42-year-old female presenting with multiple complaints. Her primary concern is her left shoulder pain that seems to radiate to her upper chest that is not improved with use of ibuprofen.  On chart review, I see that she has been evaluated in the past, several months ago, she had a left shoulder x-ray performed at that time that showed calcification of the left supraspinatus tendon, and was treated with a steroid shot.  With regards to her other symptoms, I have a low suspicion for acute coronary syndrome given that her pain is reproducible on exam.  With regard to the numbness in her right hand, I suspect this is related to a peripheral neuropathy as the decreased sensation does not extend proximal to the wrist.  She has otherwise a normal neurologic exam.  With regards to her headache, suspect tension headache versus sinus headache. I do not suspect subarachnoid hemorrhage, bacterial meningitis.  As this patient has multiple symptoms multiple complaints, will check labs, chest x-ray, EKG, will get CT head.  I will treat with IV fluids, Compazine, Benadryl.  Suspect left shoulder is a musculoskeletal issue, will treat with Decadron.            Scribe Attestation:   Scribe #1: I performed the above scribed service and the documentation accurately describes the services I performed. I attest to the accuracy of the note.    Attending Attestation:           Physician Attestation for Scribe:  Physician Attestation Statement for Scribe #1: I, Laurie Blair MD, reviewed documentation, as scribed by Bonnie Mcarthur in my presence, and it is both accurate and complete.                 ED Course as of Oct 14 0944   Sat Oct 13, 2018   0568 Patient reassessed, she says her headache has resolved.  She still has pain in her left shoulder.  I will treat her with an IM injection of Decadron.  Will also discharge with course of muscle relaxants as I believe her pain is musculoskeletal.  I did review  her laboratory findings, chest x-ray and CT head findings.  After reviewing all this, patient asked why she felt so fatigued all the time.  I advised her that medication we administered today can cause fatigue, to this she responded that she has been fatigued for quite some time.  I advised her to follow up with her primary care physician is I do not see any evidence of severe anemia, low thyroid, or metabolic derangement to cause her chronic fatigue.  [LH]      ED Course User Index  [LH] Laurie Blair MD     Clinical Impression:   The primary encounter diagnosis was Left shoulder pain, unspecified chronicity. Diagnoses of Chest pain, Nonintractable headache, unspecified chronicity pattern, unspecified headache type, and Fatigue, unspecified type were also pertinent to this visit.                             Laurie Blair MD  10/14/18 0902

## 2019-12-19 ENCOUNTER — HOSPITAL ENCOUNTER (EMERGENCY)
Facility: HOSPITAL | Age: 44
Discharge: HOME | End: 2019-12-19
Attending: EMERGENCY MEDICINE
Payer: MEDICAID

## 2019-12-19 VITALS
HEART RATE: 96 BPM | BODY MASS INDEX: 28.32 KG/M2 | HEIGHT: 61 IN | WEIGHT: 150 LBS | OXYGEN SATURATION: 97 % | RESPIRATION RATE: 18 BRPM | TEMPERATURE: 98 F | SYSTOLIC BLOOD PRESSURE: 145 MMHG | DIASTOLIC BLOOD PRESSURE: 92 MMHG

## 2019-12-19 DIAGNOSIS — G89.29 CHRONIC LEFT SHOULDER PAIN: Primary | ICD-10-CM

## 2019-12-19 DIAGNOSIS — M54.12 CERVICAL RADICULOPATHY: ICD-10-CM

## 2019-12-19 DIAGNOSIS — M25.512 CHRONIC LEFT SHOULDER PAIN: Primary | ICD-10-CM

## 2019-12-19 LAB
B-HCG UR QL: NEGATIVE
CTP QC/QA: YES

## 2019-12-19 PROCEDURE — 63600175 PHARM REV CODE 636 W HCPCS: Mod: ER | Performed by: EMERGENCY MEDICINE

## 2019-12-19 PROCEDURE — 96372 THER/PROPH/DIAG INJ SC/IM: CPT | Mod: ER

## 2019-12-19 PROCEDURE — 81025 URINE PREGNANCY TEST: CPT | Mod: ER | Performed by: EMERGENCY MEDICINE

## 2019-12-19 PROCEDURE — 99284 EMERGENCY DEPT VISIT MOD MDM: CPT | Mod: 25,ER

## 2019-12-19 RX ORDER — DEXAMETHASONE SODIUM PHOSPHATE 4 MG/ML
8 INJECTION, SOLUTION INTRA-ARTICULAR; INTRALESIONAL; INTRAMUSCULAR; INTRAVENOUS; SOFT TISSUE
Status: COMPLETED | OUTPATIENT
Start: 2019-12-19 | End: 2019-12-19

## 2019-12-19 RX ORDER — METHOCARBAMOL 750 MG/1
1500 TABLET, FILM COATED ORAL EVERY 6 HOURS
Qty: 24 TABLET | Refills: 0 | Status: SHIPPED | OUTPATIENT
Start: 2019-12-19 | End: 2019-12-22

## 2019-12-19 RX ORDER — KETOROLAC TROMETHAMINE 10 MG/1
10 TABLET, FILM COATED ORAL EVERY 6 HOURS PRN
Qty: 12 TABLET | Refills: 0 | Status: SHIPPED | OUTPATIENT
Start: 2019-12-19 | End: 2019-12-22

## 2019-12-19 RX ORDER — METHYLPREDNISOLONE 4 MG/1
TABLET ORAL
Qty: 1 PACKAGE | Refills: 0 | Status: SHIPPED | OUTPATIENT
Start: 2019-12-19 | End: 2020-01-09

## 2019-12-19 RX ORDER — GABAPENTIN 100 MG/1
100 CAPSULE ORAL 3 TIMES DAILY
Qty: 45 CAPSULE | Refills: 0 | Status: SHIPPED | OUTPATIENT
Start: 2019-12-19 | End: 2020-01-18

## 2019-12-19 RX ADMIN — DEXAMETHASONE SODIUM PHOSPHATE 8 MG: 4 INJECTION, SOLUTION INTRA-ARTICULAR; INTRALESIONAL; INTRAMUSCULAR; INTRAVENOUS; SOFT TISSUE at 07:12

## 2019-12-20 NOTE — ED PROVIDER NOTES
Encounter Date: 12/19/2019    SCRIBE #1 NOTE: I, Mary Mims , am scribing for, and in the presence of,  Dr. Eisenberg . I have scribed the following portions of the note - Other sections scribed: HPI, CELIA PE .       History     Chief Complaint   Patient presents with    Arm Pain     pt c/o L arm and shoulder pain x 2 months intermittedly     Danya Garcia is a 44 y.o. female who presents to the ED complaining of gradually worsening, severe left arm pain that starts in her left 1st and 2nd fingers and radiates up to her shoulder, neck, and head. Pt states that the pain initially began a few months ago as an intermittent pain but it has been constant and worsening since last night. She was seen at [facility] a few weeks ago and a x-ray was done but it as unremarkable. She was seen by her PCP last Monday who recommended therapy and 800 mg Ibuprofen. Pt states that the Ibuprofen is not working. Pt also reports that the pain is worse at night and worse with movement above her head. She works as a  and is left hand dominant. She denies swelling and redness to the area. Denies remote or recent trauma to the area.    The history is provided by the patient.     Review of patient's allergies indicates:   Allergen Reactions    Aleve [naproxen sodium] Hives    Shellfish containing products Rash     Past Medical History:   Diagnosis Date    Depression      Past Surgical History:   Procedure Laterality Date    HEMORRHOID SURGERY N/A      No family history on file.  Social History     Tobacco Use    Smoking status: Never Smoker    Smokeless tobacco: Never Used   Substance Use Topics    Alcohol use: No    Drug use: No     Review of Systems   Constitutional: Negative for fever.   Respiratory: Negative for shortness of breath.    Cardiovascular: Negative for chest pain.   Musculoskeletal: Positive for arthralgias, myalgias and neck pain. Negative for joint swelling.   Skin: Negative for color change, rash and  wound.   Neurological: Negative for weakness and numbness.   All other systems reviewed and are negative.      Physical Exam     Initial Vitals [12/19/19 1904]   BP Pulse Resp Temp SpO2   (!) 145/92 96 18 97.8 °F (36.6 °C) 97 %      MAP       --         Physical Exam    Nursing note and vitals reviewed.  Constitutional: She appears well-developed and well-nourished.   HENT:   Head: Normocephalic and atraumatic.   Eyes: Conjunctivae are normal.   Neck: Normal range of motion and phonation normal. Neck supple.   Cardiovascular: Normal rate and intact distal pulses.   Pulmonary/Chest: Effort normal. No stridor. No respiratory distress.   Musculoskeletal: Normal range of motion. She exhibits no edema.        Left shoulder: She exhibits tenderness and spasm (left shoulder). She exhibits normal range of motion (ROM reproduces pain), no bony tenderness, no swelling, no effusion, no crepitus, no deformity, no laceration, normal pulse and normal strength.   ROM above 90 degrees reproduces pain. The pain is localized to the left proximal forearm and shoulder.    Neurological: She is alert and oriented to person, place, and time.   Skin: Skin is warm, dry and intact. Capillary refill takes less than 2 seconds. No abrasion, no bruising, no ecchymosis and no rash noted. No erythema.   Psychiatric: She has a normal mood and affect. Her behavior is normal.         ED Course   Procedures  Labs Reviewed   POCT URINE PREGNANCY          Imaging Results    None          Medical Decision Making:   Clinical Tests:   Lab Tests: Ordered and Reviewed    Labs Reviewed  Admission on 12/19/2019, Discharged on 12/19/2019   Component Date Value Ref Range Status    POC Preg Test, Ur 12/19/2019 Negative  Negative Final     Acceptable 12/19/2019 Yes   Final        Imaging Reviewed    Imaging Results    None         Medications given in ED    Medications   dexamethasone injection 8 mg (8 mg Intramuscular Given 12/19/19 1943)        This document was produced by a scribe under my direction and in my presence. I agree with the content of the note and have made any necessary edits.     Sebas Eisenberg MD         Note was created using voice recognition software. Note may have occasional typographical errors that may not have been identified and edited despite good cindy initial review prior to signing.            Scribe Attestation:   Scribe #1: I performed the above scribed service and the documentation accurately describes the services I performed. I attest to the accuracy of the note.      Discharge Medications     Discharge Medication List as of 12/19/2019  7:47 PM      START taking these medications    Details   gabapentin (NEURONTIN) 100 MG capsule Take 1 capsule (100 mg total) by mouth 3 (three) times daily. Take one tab at bedtime x 3 days. Then take one tab Q12 x 3 days. Then take one tab Q8, Starting u 12/19/2019, Until Sat 1/18/2020, Normal      ketorolac (TORADOL) 10 mg tablet Take 1 tablet (10 mg total) by mouth every 6 (six) hours as needed for Pain (take with food)., Starting Th 12/19/2019, Until Sun 12/22/2019, Normal      methocarbamol (ROBAXIN) 750 MG Tab Take 2 tablets (1,500 mg total) by mouth every 6 (six) hours. for 3 days, Starting u 12/19/2019, Until Sun 12/22/2019, Normal      methylPREDNISolone (MEDROL DOSEPACK) 4 mg tablet Take as directed, Normal         CONTINUE these medications which have NOT CHANGED    Details   cetirizine (ZYRTEC) 10 MG tablet Take 10 mg by mouth once daily., Historical Med      paroxetine (PAXIL) 30 MG tablet Take 30 mg by mouth every morning., Historical Med      promethazine (PHENERGAN) 25 MG tablet Take 1 tablet (25 mg total) by mouth every 4 (four) hours., Starting Fri 9/22/2017, Normal                   Patient discharged to home in stable condition with instructions to:   1. Please take all meds as prescribed.  2. Follow-up with your primary care doctor   3. Return precautions  discussed and patient and/or family/caretaker understands to return to the emergency room for any concerns including worsening of your current symptoms, fever, chills, night sweats, worsening pain, chest pain, shortness of breath, nausea, vomiting, diarrhea, bleeding, headache, difficulty talking, visual disturbances, weakness, numbness or any other acute concerns                        Clinical Impression:     1. Chronic left shoulder pain    2. Cervical radiculopathy            Disposition:   Disposition: Discharged  Condition: Stable                     Sebas Eisenberg MD  12/19/19 7318

## 2019-12-20 NOTE — ED NOTES
Past Medical History:   Diagnosis Date    Depression      Pt presenting with co of left side pain,  from head, ear, eyem neck, shoulder, arm, down to fingers with numbness and tingling for months.  Pt states that it is getting worse.  She has seen her PCP.

## 2021-04-15 ENCOUNTER — PATIENT MESSAGE (OUTPATIENT)
Dept: RESEARCH | Facility: HOSPITAL | Age: 46
End: 2021-04-15

## 2021-07-01 ENCOUNTER — PATIENT MESSAGE (OUTPATIENT)
Dept: ADMINISTRATIVE | Facility: OTHER | Age: 46
End: 2021-07-01

## 2022-12-14 ENCOUNTER — HOSPITAL ENCOUNTER (EMERGENCY)
Facility: HOSPITAL | Age: 47
Discharge: HOME | End: 2022-12-14
Attending: EMERGENCY MEDICINE
Payer: MEDICAID

## 2022-12-14 VITALS
HEIGHT: 61 IN | DIASTOLIC BLOOD PRESSURE: 86 MMHG | WEIGHT: 145 LBS | SYSTOLIC BLOOD PRESSURE: 145 MMHG | TEMPERATURE: 99 F | HEART RATE: 72 BPM | BODY MASS INDEX: 27.38 KG/M2 | RESPIRATION RATE: 18 BRPM | OXYGEN SATURATION: 100 %

## 2022-12-14 DIAGNOSIS — J40 BRONCHITIS: Primary | ICD-10-CM

## 2022-12-14 DIAGNOSIS — R05.9 COUGH: ICD-10-CM

## 2022-12-14 LAB
CTP QC/QA: YES
INFLUENZA A ANTIGEN, POC: NEGATIVE
INFLUENZA B ANTIGEN, POC: NEGATIVE
POC RAPID STREP A: NEGATIVE
SARS-COV-2 RDRP RESP QL NAA+PROBE: NEGATIVE

## 2022-12-14 PROCEDURE — 99284 EMERGENCY DEPT VISIT MOD MDM: CPT | Mod: 25,ER

## 2022-12-14 PROCEDURE — 87804 INFLUENZA ASSAY W/OPTIC: CPT | Mod: 59,ER

## 2022-12-14 PROCEDURE — 87635 SARS-COV-2 COVID-19 AMP PRB: CPT | Mod: ER | Performed by: EMERGENCY MEDICINE

## 2022-12-14 RX ORDER — PROMETHAZINE HYDROCHLORIDE AND DEXTROMETHORPHAN HYDROBROMIDE 6.25; 15 MG/5ML; MG/5ML
5 SYRUP ORAL EVERY 4 HOURS PRN
Qty: 118 ML | Refills: 0 | Status: SHIPPED | OUTPATIENT
Start: 2022-12-14 | End: 2022-12-19

## 2022-12-14 RX ORDER — AZITHROMYCIN 250 MG/1
TABLET, FILM COATED ORAL
Qty: 6 TABLET | Refills: 0 | Status: SHIPPED | OUTPATIENT
Start: 2022-12-14 | End: 2022-12-19

## 2022-12-14 NOTE — ED PROVIDER NOTES
"Encounter Date: 12/14/2022    SCRIBE #1 NOTE: I, Cheyanne Joe, am scribing for, and in the presence of,  Breanne Meadows MD. I have scribed the following portions of the note - Other sections scribed: HPI, ROS, PE.     History     Chief Complaint   Patient presents with    URI     Pt arrived to ED GCS 15 A&O x 4 ABCs intact c/o cough congestion and fever times 4 day      Danya Garcia is a 47 y.o. female, with a PMHx of Depression, who presents to the ED for productive cough with yellow mucus and shortness of breath onset 4 days ago . Patient states most times it feels like "she can't breathe". Patient notes a fever came upon her 12/09/22 and she has been feeling sick since. Per patient and chart review she was hospitalized at [facility] from 9/27/22-9/30/22 due to similar symptoms and was diagnosed with Pneumonia. Patient has associated symptoms of headache, myalgias, and sore throat. Patient reports she attempts treatment with tylenol and Theraflu with no relief to symptoms. No other exacerbating or alleviating factors. Patient has no known drug allergies.    The history is provided by the patient.   Review of patient's allergies indicates:   Allergen Reactions    Aleve [naproxen sodium] Hives    Shellfish containing products Rash     Past Medical History:   Diagnosis Date    Depression      Past Surgical History:   Procedure Laterality Date    HEMORRHOID SURGERY N/A      No family history on file.  Social History     Tobacco Use    Smoking status: Never    Smokeless tobacco: Never   Substance Use Topics    Alcohol use: No    Drug use: No     Review of Systems   Constitutional:  Negative for chills and fever.   HENT:  Positive for sore throat. Negative for congestion.    Eyes:  Negative for pain.   Respiratory:  Positive for cough and shortness of breath. Negative for wheezing.    Cardiovascular:  Negative for chest pain.   Gastrointestinal:  Negative for abdominal pain.   Genitourinary:  " Negative for flank pain.   Musculoskeletal:  Positive for myalgias.   Skin:  Negative for color change.   Neurological:  Positive for headaches. Negative for weakness.   Hematological:  Does not bruise/bleed easily.   Psychiatric/Behavioral:  Negative for suicidal ideas.    All other systems reviewed and are negative.    Physical Exam     Initial Vitals   BP Pulse Resp Temp SpO2   12/14/22 1502 12/14/22 1501 12/14/22 1501 12/14/22 1501 12/14/22 1501   (!) 153/93 72 18 98.7 °F (37.1 °C) 100 %      MAP       --                Physical Exam    Nursing note and vitals reviewed.  Constitutional: She appears well-developed.   HENT:   Head: Normocephalic.   Mouth/Throat: Posterior oropharyngeal erythema present.   Eyes: Conjunctivae are normal.   Neck:   Normal range of motion.  Cardiovascular:  Regular rhythm and normal heart sounds.     Exam reveals no decreased pulses.       No murmur heard.  Pulmonary/Chest: Breath sounds normal. No respiratory distress.   Wheezes scattered in all fields.   Abdominal: Abdomen is soft. She exhibits no distension.   Musculoskeletal:         General: Normal range of motion.      Cervical back: Normal range of motion.     Neurological: She is alert.   Skin: Skin is warm and dry.   Psychiatric: She has a normal mood and affect.       ED Course   Procedures  Labs Reviewed   SARS-COV-2 RDRP GENE    Narrative:     This test utilizes isothermal nucleic acid amplification technology to detect the SARS-CoV-2 RdRp nucleic acid segment. The analytical sensitivity (limit of detection) is 500 copies/swab.     A POSITIVE result is indicative of the presence of SARS-CoV-2 RNA; clinical correlation with patient history and other diagnostic information is necessary to determine patient infection status.    A NEGATIVE result means that SARS-CoV-2 nucleic acids are not present above the limit of detection. A NEGATIVE result should be treated as presumptive. It does not rule out the possibility of COVID-19  and should not be the sole basis for treatment decisions. If COVID-19 is strongly suspected based on clinical and exposure history, re-testing using an alternate molecular assay should be considered.     This test is only for use under the Food and Drug Administration s Emergency Use Authorization (EUA).     Commercial kits are provided by [employer]. Performance characteristics of the EUA have been independently verified by [facility] Department of Pathology and Laboratory Medicine.   _________________________________________________________________   The authorized Fact Sheet for Healthcare Providers and the authorized Fact Sheet for Patients of the ID NOW COVID-19 are available on the FDA website:    https://www.fda.gov/media/716455/download      https://www.fda.gov/media/449524/download      POCT STREP A, RAPID   POCT RAPID INFLUENZA A/B          Imaging Results              X-Ray Chest PA And Lateral (Final result)  Result time 12/14/22 17:49:14      Final result by Jose Catalan MD (12/14/22 17:49:14)                   Impression:      No acute cardiopulmonary process identified.      Electronically signed by: Jose Catalan MD  Date:    12/14/2022  Time:    17:49               Narrative:    EXAMINATION:  XR CHEST PA AND LATERAL    CLINICAL HISTORY:  Cough, unspecified    TECHNIQUE:  PA and lateral views of the chest were performed.    COMPARISON:  October 2018.    FINDINGS:  Cardiac silhouette is normal in size.  Lungs are symmetrically expanded.  No evidence of focal consolidative process, pneumothorax, or significant pleural effusion.  No acute osseous abnormality identified.                                       Medications - No data to display  Medical Decision Making:   History:   Old Medical Records: I decided to obtain old medical records.  Initial Assessment:   This is an urgent evaluation of a 47-year-old woman who presented to the emergency department today for evaluation of  cough, sore throat, and congestion.   Differential Diagnosis:   Influenza, covid, strep, pneumonia, amongst others.   Clinical Tests:   Lab Tests: Ordered and Reviewed  Radiological Study: Ordered and Reviewed  ED Management:  On physical examination, patient was in no acute distress.  Of note-patient was offered an  but stated she did not want nor need one as she speaks adequate English. She was offered again using video  but stated she did not feel this was necessary.     Her heart sounds were within normal limits.  She did have scattered wheezes on lung exam.  I therefore ordered a chest x-ray which showed no acute findings per Radiology interpretation.  All swabs were negative in the ED. however, given her symptomatology, I will empirically treat her with azithromycin to cover for atypical pathogens as well as with promethazine-dextromethorphan cough syrup for her symptomatology.  She is been advised close follow-up with her primary care physician within the next 2-3 days and has given return precautions.  She voiced understanding of this and was discharged home in stable condition.    Breanne Meadows MD 1807 PM  2022           Scribe Attestation:   Scribe #1: I performed the above scribed service and the documentation accurately describes the services I performed. I attest to the accuracy of the note.                   Clinical Impression:   Final diagnoses:  [R05.9] Cough  [J40] Bronchitis (Primary)         I, Breanne Meadows MD, personally performed the services described in the documentation.  All medical records entries made by the scribe were made at my direction and in my presence.  I have reviewed the chart and agree that the record reflects my personal performance and is accurate and complete.    ED Disposition Condition    Discharge Stable          ED Prescriptions       Medication Sig Dispense Start Date End Date Auth. Provider    azithromycin (Z-DINAH) 250 MG tablet () Take 2  tablets by mouth on day 1; Take 1 tablet by mouth on days 2-5 6 tablet 2022 Breanne Meadows MD    promethazine-dextromethorphan (PROMETHAZINE-DM) 6.25-15 mg/5 mL Syrp () Take 5 mLs by mouth every 4 (four) hours as needed. 118 mL 2022 Breanne Meadows MD          Follow-up Information       Follow up With Specialties Details Why Contact Info    Son SAMREENWilberto Garcia MD Family Medicine In 1 week  [address]  [place] [address]  [phone]               Breanne Meadows MD  22 0424

## 2022-12-15 NOTE — DISCHARGE INSTRUCTIONS

## 2023-11-07 ENCOUNTER — OFFICE VISIT (OUTPATIENT)
Dept: URGENT CARE | Facility: CLINIC | Age: 48
End: 2023-11-07
Payer: MEDICAID

## 2023-11-07 VITALS
DIASTOLIC BLOOD PRESSURE: 69 MMHG | SYSTOLIC BLOOD PRESSURE: 111 MMHG | HEART RATE: 68 BPM | RESPIRATION RATE: 16 BRPM | BODY MASS INDEX: 27.38 KG/M2 | TEMPERATURE: 98 F | OXYGEN SATURATION: 96 % | WEIGHT: 145 LBS | HEIGHT: 61 IN

## 2023-11-07 DIAGNOSIS — Z20.822 ENCOUNTER FOR LABORATORY TESTING FOR COVID-19 VIRUS: ICD-10-CM

## 2023-11-07 DIAGNOSIS — G44.209 TENSION HEADACHE: Primary | ICD-10-CM

## 2023-11-07 DIAGNOSIS — R50.81 FEVER IN OTHER DISEASES: ICD-10-CM

## 2023-11-07 PROBLEM — Z30.41 ORAL CONTRACEPTIVE USE: Status: ACTIVE | Noted: 2019-03-18

## 2023-11-07 PROBLEM — I51.7 CARDIOMEGALY: Status: ACTIVE | Noted: 2019-03-18

## 2023-11-07 PROBLEM — E78.5 HYPERLIPIDEMIA: Status: ACTIVE | Noted: 2022-05-17

## 2023-11-07 PROBLEM — I10 ESSENTIAL HYPERTENSION: Status: ACTIVE | Noted: 2022-05-17

## 2023-11-07 PROBLEM — M54.2 CHRONIC NECK PAIN: Status: ACTIVE | Noted: 2022-05-17

## 2023-11-07 PROBLEM — R73.03 PREDIABETES: Status: ACTIVE | Noted: 2022-05-17

## 2023-11-07 PROBLEM — M79.606 LEG PAIN: Status: ACTIVE | Noted: 2023-09-26

## 2023-11-07 PROBLEM — M47.22 CERVICAL SPONDYLOSIS WITH RADICULOPATHY: Status: ACTIVE | Noted: 2021-02-01

## 2023-11-07 PROBLEM — D25.9 UTERINE LEIOMYOMA: Status: ACTIVE | Noted: 2019-01-24

## 2023-11-07 PROBLEM — G89.29 CHRONIC NECK PAIN: Status: ACTIVE | Noted: 2022-05-17

## 2023-11-07 LAB
CTP QC/QA: YES
CTP QC/QA: YES
POC MOLECULAR INFLUENZA A AGN: NEGATIVE
POC MOLECULAR INFLUENZA B AGN: NEGATIVE
SARS-COV-2 AG RESP QL IA.RAPID: NEGATIVE

## 2023-11-07 PROCEDURE — 99203 OFFICE O/P NEW LOW 30 MIN: CPT | Mod: S$GLB,,, | Performed by: FAMILY MEDICINE

## 2023-11-07 PROCEDURE — 87502 POCT INFLUENZA A/B MOLECULAR: ICD-10-PCS | Mod: QW,S$GLB,, | Performed by: FAMILY MEDICINE

## 2023-11-07 PROCEDURE — 87502 INFLUENZA DNA AMP PROBE: CPT | Mod: QW,S$GLB,, | Performed by: FAMILY MEDICINE

## 2023-11-07 PROCEDURE — 99203 PR OFFICE/OUTPT VISIT, NEW, LEVL III, 30-44 MIN: ICD-10-PCS | Mod: S$GLB,,, | Performed by: FAMILY MEDICINE

## 2023-11-07 PROCEDURE — 87811 SARS-COV-2 COVID19 W/OPTIC: CPT | Mod: QW,S$GLB,, | Performed by: FAMILY MEDICINE

## 2023-11-07 PROCEDURE — 87811 SARS CORONAVIRUS 2 ANTIGEN POCT, MANUAL READ: ICD-10-PCS | Mod: QW,S$GLB,, | Performed by: FAMILY MEDICINE

## 2023-11-07 RX ORDER — ALBUTEROL SULFATE 90 UG/1
2 AEROSOL, METERED RESPIRATORY (INHALATION) EVERY 6 HOURS PRN
COMMUNITY
Start: 2023-10-23

## 2023-11-07 RX ORDER — CYCLOSPORINE 0.5 MG/ML
1 EMULSION OPHTHALMIC 2 TIMES DAILY
COMMUNITY
Start: 2023-10-23

## 2023-11-07 RX ORDER — FLUTICASONE PROPIONATE 50 MCG
1 SPRAY, SUSPENSION (ML) NASAL
COMMUNITY
Start: 2023-03-20 | End: 2024-03-19

## 2023-11-07 RX ORDER — PAROXETINE HYDROCHLORIDE 40 MG/1
40 TABLET, FILM COATED ORAL EVERY MORNING
COMMUNITY
Start: 2023-10-23

## 2023-11-07 RX ORDER — IBUPROFEN 800 MG/1
800 TABLET ORAL EVERY 8 HOURS PRN
COMMUNITY
Start: 2023-04-10

## 2023-11-07 RX ORDER — PREDNISONE 20 MG/1
20 TABLET ORAL 2 TIMES DAILY
COMMUNITY
Start: 2023-07-10

## 2023-11-07 RX ORDER — LOSARTAN POTASSIUM 100 MG/1
100 TABLET ORAL
COMMUNITY
Start: 2023-10-23

## 2023-11-07 RX ORDER — LINACLOTIDE 145 UG/1
145 CAPSULE, GELATIN COATED ORAL EVERY MORNING
COMMUNITY
Start: 2023-10-23

## 2023-11-07 RX ORDER — ESTRADIOL 1 MG/1
1 TABLET ORAL
COMMUNITY
Start: 2023-10-23

## 2023-11-07 RX ORDER — GABAPENTIN 300 MG/1
300 CAPSULE ORAL NIGHTLY
COMMUNITY
Start: 2023-10-23

## 2023-11-07 RX ORDER — MUPIROCIN 20 MG/G
OINTMENT TOPICAL 2 TIMES DAILY PRN
COMMUNITY
Start: 2023-08-07

## 2023-11-07 RX ORDER — PIMECROLIMUS 10 MG/G
CREAM TOPICAL 2 TIMES DAILY PRN
COMMUNITY
Start: 2023-10-23

## 2023-11-07 RX ORDER — OMEPRAZOLE 20 MG/1
20 CAPSULE, DELAYED RELEASE ORAL
COMMUNITY
Start: 2023-10-28

## 2023-11-07 RX ORDER — FAMOTIDINE 20 MG/1
TABLET, FILM COATED ORAL
COMMUNITY
Start: 2023-05-16

## 2023-11-07 RX ORDER — ROSUVASTATIN CALCIUM 10 MG/1
1 TABLET, COATED ORAL DAILY
COMMUNITY
Start: 2023-10-01 | End: 2024-09-30

## 2023-11-07 RX ORDER — AMLODIPINE BESYLATE 5 MG/1
5 TABLET ORAL
COMMUNITY
Start: 2023-10-23

## 2023-11-07 NOTE — PROGRESS NOTES
"Subjective:      Patient ID: Danya Garcia is a 47 y.o. female.    Vitals:  height is 5' 1" (1.549 m) and weight is 65.8 kg (145 lb). Her oral temperature is 97.8 °F (36.6 °C). Her blood pressure is 111/69 and her pulse is 68. Her respiration is 16 and oxygen saturation is 96%.     Chief Complaint: Headache    Pt c/o body aches that started 10/4, she says she started with a headache today. She says she took tylenol that helped her headache. She is refusing . No fever or chills. No cp, cough or SOB. No GI related symptoms, including, N/v/D or constipation. No anosmia or ageusia.      Headache   This is a new problem. The current episode started yesterday (4 days ago). The problem occurs constantly. The problem has been gradually worsening. The pain is located in the Frontal and parietal region. The pain quality is similar to prior headaches. The quality of the pain is described as shooting. The pain is at a severity of 8/10. The pain is moderate. Associated symptoms include muscle aches. Pertinent negatives include no abdominal pain, abnormal behavior, anorexia, back pain, blurred vision, coughing, dizziness, drainage, ear pain, eye pain, eye redness, eye watering, facial sweating, fever, hearing loss, insomnia, loss of balance, nausea, neck pain, numbness, phonophobia, photophobia, rhinorrhea, scalp tenderness, seizures, sinus pressure, sore throat, swollen glands, tingling, tinnitus, visual change, vomiting, weakness or weight loss. The symptoms are aggravated by bright light. She has tried acetaminophen for the symptoms. The treatment provided no relief. Her past medical history is significant for hypertension. There is no history of cancer, cluster headaches, immunosuppression, migraine headaches, migraines in the family, obesity, pseudotumor cerebri, recent head traumas, sinus disease or TMJ.       Constitution: Negative for fever.   HENT:  Negative for ear pain, tinnitus, hearing loss, sinus pressure " and sore throat.    Neck: Negative for neck pain.   Eyes:  Negative for eye pain, eye redness, photophobia and blurred vision.   Respiratory:  Negative for cough.    Gastrointestinal:  Negative for abdominal pain, nausea and vomiting.   Musculoskeletal:  Negative for back pain.   Neurological:  Positive for headaches. Negative for dizziness, loss of balance, history of migraines, numbness and seizures.   Psychiatric/Behavioral:  The patient does not have insomnia.       Objective:     Physical Exam   Constitutional: She is oriented to person, place, and time. She appears well-developed.  Non-toxic appearance. She does not appear ill. No distress.      Comments:Daughter present.      HENT:   Head: Normocephalic and atraumatic.   Ears:   Right Ear: External ear normal.   Left Ear: External ear normal.   Nose: Nose normal.   Mouth/Throat: Oropharynx is clear and moist.   Eyes: Conjunctivae, EOM and lids are normal. Pupils are equal, round, and reactive to light.   Neck: Trachea normal and phonation normal. Neck supple.   Pulmonary/Chest: Effort normal and breath sounds normal.   Musculoskeletal: Normal range of motion.         General: Normal range of motion.   Neurological: She is alert and oriented to person, place, and time. She has normal motor skills and normal sensation. Gait and coordination normal.   Skin: Skin is warm, dry, intact and not diaphoretic.   Psychiatric: Her speech is normal and behavior is normal. Judgment and thought content normal.   Nursing note and vitals reviewed.      Assessment:     1. Tension headache    2. Encounter for laboratory testing for COVID-19 virus    3. Fever in other diseases      Results for orders placed or performed in visit on 11/07/23   SARS Coronavirus 2 Antigen, POCT Manual Read   Result Value Ref Range    SARS Coronavirus 2 Antigen Negative Negative     Acceptable Yes    POCT Influenza A/B MOLECULAR   Result Value Ref Range    POC Molecular Influenza A Ag  Negative Negative, Not Reported    POC Molecular Influenza B Ag Negative Negative, Not Reported     Acceptable Yes       Plan:      there is no:  Systemic symptoms including fever    ?Neoplasm history    ?Neurologic deficit (including decreased consciousness)    ?Onset is sudden or abrupt    ?Older age (onset after age 50 years)    ?Pattern change or recent onset of new headache    ?Precipitated by sneezing, coughing, or exercise    ?Progressive headache and atypical presentations    ?Painful eye with autonomic features    ?Post-traumatic onset of headache    ?Pathology of the immune system such as HIV    Advised to take Tylenol and ibuprofen as needed for headache, follow-up with PCP if no improvement.    Discussed results/diagnosis/plan with patient in clinic. Strict precautions given to patient to monitor for worsening signs and symptoms. Advised to follow up with PCP or specialist.    Explained side effects of medications prescribed with patient and informed him/her to discontinue use if he/she has any side effects and to inform UC or PCP if this occurs. All questions answered. Strict ED verses clinic return precautions stressed and given in depth. Advised if symptoms worsens of fail to improve he/she should go to the Emergency Room. Discharge and follow-up instructions given verbally/printed with the patient who expressed understanding and willingness to comply with my recommendations. Patient voiced understanding and in agreement with current treatment plan. Patient exits the exam room in no acute distress. Conversant and engaged during discharge discussion, verbalized understanding.      Tension headache    Encounter for laboratory testing for COVID-19 virus  -     SARS Coronavirus 2 Antigen, POCT Manual Read    Fever in other diseases  -     POCT Influenza A/B MOLECULAR                Patient Instructions   General Discharge Instructions   PLEASE READ YOUR DISCHARGE INSTRUCTIONS ENTIRELY AS IT  CONTAINS IMPORTANT INFORMATION.  If you were prescribed a narcotic or controlled medication, do not drive or operate heavy equipment or machinery while taking these medications.  If you were prescribed antibiotics, please take them to completion.  You must understand that you've received an Urgent Care treatment only and that you may be released before all your medical problems are known or treated. You, the patient, will arrange for follow up care as instructed.    OVER THE COUNTER RECOMMENDATIONS/SUGGESTIONS.    Make sure to stay well hydrated.    Use Nasal Saline to mechanically move any post nasal drip from your eustachian tube or from the back of your throat.    Use warm salt water gargles to ease your throat pain. Warm salt water gargles as needed for sore throat- 1/2 tsp salt to 1 cup warm water, gargle as desired.    Use an antihistamine such as Claritin, Zyrtec or Allegra to dry you out.    Use pseudoephedrine (behind the counter) to decongest. Pseudoephedrine 30 mg up to 240 mg /day. It can raise your blood pressure and give you palpitations.    Use mucinex (guaifenesin) to break up mucous up to 2400mg/day to loosen any mucous.    The mucinex DM pill has a cough suppressant that can be sedating. It can be used at night to stop the tickle at the back of your throat.    You can use Mucinex D (it has guaifenesin and a high dose of pseudoephedrine) in the mornings to help decongest.    Use Afrin in each nare for no longer than 3 days, as it is addictive. It can also dry out your mucous membranes and cause elevated blood pressure. This is especially useful if you are flying.    Use Flonase 1-2 sprays/nostril per day. It is a local acting steroid nasal spray, if you develop a bloody nose, stop using the medication immediately.    Sometimes Nyquil at night is beneficial to help you get some rest, however it is sedating and it does have an antihistamine, and tylenol.    Honey is a natural cough suppressant that  can be used.    Tylenol up to 4,000 mg a day is safe for short periods and can be used for body aches, pain, and fever. However in high doses and prolonged use it can cause liver irritation.    Ibuprofen is a non-steroidal anti-inflammatory that can be used for body aches, pain, and fever.However it can also cause stomach irritation if over used.     Follow up with your PCP or specialty clinic as instructed in the next 2-3 days if not improved or as needed. You can call [phone] to schedule an appointment with appropriate provider.      If you condition worsens, we recommend that you receive another evaluation at the emergency room immediately or contact your primary medical clinic's after hours call service to discuss your concerns.      Please return here or go to the Emergency Department for any concerns or worsening condition.   You can also call [phone] to schedule an appointment with the appropriate provider.    Please return here or go to the Emergency Department for any concerns or worsening of condition.    Thank you for choosing [facility]!    Our goal in the Urgent Care is to always provide outstanding medical care. You may receive a survey by mail or e-mail in the next week regarding your experience today. We would greatly appreciate you completing and returning the survey. Your feedback provides us with a way to recognize our staff who provide very good care, and it helps us learn how to improve when your experience was below our aspiration of excellence.      We appreciate you trusting us with your medical care. We hope you feel better soon. We will be happy to take care of you for all of your future medical needs.    Sincerely,    RAJEEV Fagan                                                      Headache   If your condition worsens or fails to improve we recommend that you receive another evaluation at the ER immediately or contact your PCP to discuss your concerns or  return here. You must understand that you've received an urgent care treatment only and that you may be released before all your medical problems are known or treated. You [facility]   If you were given narcotic prescriptions or muscle relaxants do not operate heavy equipment or machinery while taking these medications   Get rest and drink fluids. Get plenty of rest.  Watch for increase pain, fever, vomiting, neck pain or mental confusion.   You can also use tylenol or ibuprofen as directed as long as you don't have any allergies to these medications or medical conditions such as ulcers, liver or kidney disease or blood thinners etc that would prevent you from taking these meds.

## 2023-11-07 NOTE — PATIENT INSTRUCTIONS
General Discharge Instructions   PLEASE READ YOUR DISCHARGE INSTRUCTIONS ENTIRELY AS IT CONTAINS IMPORTANT INFORMATION.  If you were prescribed a narcotic or controlled medication, do not drive or operate heavy equipment or machinery while taking these medications.  If you were prescribed antibiotics, please take them to completion.  You must understand that you've received an Urgent Care treatment only and that you may be released before all your medical problems are known or treated. You, the patient, will arrange for follow up care as instructed.    OVER THE COUNTER RECOMMENDATIONS/SUGGESTIONS.    Make sure to stay well hydrated.    Use Nasal Saline to mechanically move any post nasal drip from your eustachian tube or from the back of your throat.    Use warm salt water gargles to ease your throat pain. Warm salt water gargles as needed for sore throat- 1/2 tsp salt to 1 cup warm water, gargle as desired.    Use an antihistamine such as Claritin, Zyrtec or Allegra to dry you out.    Use pseudoephedrine (behind the counter) to decongest. Pseudoephedrine 30 mg up to 240 mg /day. It can raise your blood pressure and give you palpitations.    Use mucinex (guaifenesin) to break up mucous up to 2400mg/day to loosen any mucous.    The mucinex DM pill has a cough suppressant that can be sedating. It can be used at night to stop the tickle at the back of your throat.    You can use Mucinex D (it has guaifenesin and a high dose of pseudoephedrine) in the mornings to help decongest.    Use Afrin in each nare for no longer than 3 days, as it is addictive. It can also dry out your mucous membranes and cause elevated blood pressure. This is especially useful if you are flying.    Use Flonase 1-2 sprays/nostril per day. It is a local acting steroid nasal spray, if you develop a bloody nose, stop using the medication immediately.    Sometimes Nyquil at night is beneficial to help you get some rest, however it is sedating and it  does have an antihistamine, and tylenol.    Honey is a natural cough suppressant that can be used.    Tylenol up to 4,000 mg a day is safe for short periods and can be used for body aches, pain, and fever. However in high doses and prolonged use it can cause liver irritation.    Ibuprofen is a non-steroidal anti-inflammatory that can be used for body aches, pain, and fever.However it can also cause stomach irritation if over used.     Follow up with your PCP or specialty clinic as instructed in the next 2-3 days if not improved or as needed. You can call [phone] to schedule an appointment with appropriate provider.      If you condition worsens, we recommend that you receive another evaluation at the emergency room immediately or contact your primary medical clinic's after hours call service to discuss your concerns.      Please return here or go to the Emergency Department for any concerns or worsening condition.   You can also call [phone] to schedule an appointment with the appropriate provider.    Please return here or go to the Emergency Department for any concerns or worsening of condition.    Thank you for choosing [facility]!    Our goal in the Urgent Care is to always provide outstanding medical care. You may receive a survey by mail or e-mail in the next week regarding your experience today. We would greatly appreciate you completing and returning the survey. Your feedback provides us with a way to recognize our staff who provide very good care, and it helps us learn how to improve when your experience was below our aspiration of excellence.      We appreciate you trusting us with your medical care. We hope you feel better soon. We will be happy to take care of you for all of your future medical needs.    Sincerely,    RAJEEV Fagan                                                      Headache   If your condition worsens or fails to improve we recommend that you receive  another evaluation at the ER immediately or contact your PCP to discuss your concerns or return here. You must understand that you've received an urgent care treatment only and that you may be released before all your medical problems are known or treated. You the patient will arrange for [facility] care as instructed.   If you were given narcotic prescriptions or muscle relaxants do not operate heavy equipment or machinery while taking these medications   Get rest and drink fluids. Get plenty of rest.  Watch for increase pain, fever, vomiting, neck pain or mental confusion.   You can also use tylenol or ibuprofen as directed as long as you don't have any allergies to these medications or medical conditions such as ulcers, liver or kidney disease or blood thinners etc that would prevent you from taking these meds.

## 2025-01-09 ENCOUNTER — OFFICE VISIT (OUTPATIENT)
Dept: URGENT CARE | Facility: CLINIC | Age: 50
End: 2025-01-09
Payer: MEDICAID

## 2025-01-09 VITALS
SYSTOLIC BLOOD PRESSURE: 116 MMHG | RESPIRATION RATE: 18 BRPM | HEIGHT: 61 IN | WEIGHT: 145 LBS | BODY MASS INDEX: 27.38 KG/M2 | DIASTOLIC BLOOD PRESSURE: 78 MMHG | OXYGEN SATURATION: 98 % | TEMPERATURE: 99 F | HEART RATE: 83 BPM

## 2025-01-09 DIAGNOSIS — J01.90 ACUTE VIRAL SINUSITIS: Primary | ICD-10-CM

## 2025-01-09 DIAGNOSIS — R09.81 SINUS CONGESTION: ICD-10-CM

## 2025-01-09 DIAGNOSIS — B97.89 ACUTE VIRAL SINUSITIS: Primary | ICD-10-CM

## 2025-01-09 PROCEDURE — 87502 INFLUENZA DNA AMP PROBE: CPT | Mod: QW,S$GLB,, | Performed by: NURSE PRACTITIONER

## 2025-01-09 RX ORDER — CETIRIZINE HYDROCHLORIDE 10 MG/1
10 TABLET ORAL DAILY
Qty: 30 TABLET | Refills: 0 | Status: SHIPPED | OUTPATIENT
Start: 2025-01-09 | End: 2025-02-08

## 2025-01-09 RX ORDER — FLUTICASONE PROPIONATE 50 MCG
1 SPRAY, SUSPENSION (ML) NASAL DAILY
Qty: 16 G | Refills: 0 | Status: SHIPPED | OUTPATIENT
Start: 2025-01-09

## 2025-01-09 RX ORDER — PROMETHAZINE HYDROCHLORIDE AND DEXTROMETHORPHAN HYDROBROMIDE 6.25; 15 MG/5ML; MG/5ML
5 SYRUP ORAL NIGHTLY PRN
Qty: 118 ML | Refills: 0 | Status: SHIPPED | OUTPATIENT
Start: 2025-01-09 | End: 2025-01-19

## 2025-01-09 RX ORDER — BENZONATATE 200 MG/1
200 CAPSULE ORAL 3 TIMES DAILY PRN
Qty: 20 CAPSULE | Refills: 0 | Status: SHIPPED | OUTPATIENT
Start: 2025-01-09 | End: 2025-01-19

## 2025-01-10 NOTE — PROGRESS NOTES
"Subjective:      Patient ID: Danya Garcia is a 49 y.o. female.    Vitals:  height is 5' 1" (1.549 m) and weight is 65.8 kg (145 lb). Her oral temperature is 98.9 °F (37.2 °C). Her blood pressure is 116/78 and her pulse is 83. Her respiration is 18 and oxygen saturation is 98%.     Chief Complaint: Cough    Pt is here for sinus problem that onset 2 days ago. Pt states pain in neck, thigh, and butt. She states symptoms of productive cough, congestion,sore throat, chills, sweats, and body aches.    Provider note begins here:  Patient is a 49 year old female here with complaints of sinus congestion and cough x2 days. She also reports pain that was in her buttocks that radiated down her thighs but that has improved. She works as a . She has a hx of anxiety, cardiomegaly, and HTN.      Cough  This is a new problem. The current episode started in the past 7 days. The problem has been gradually worsening. The problem occurs constantly. The cough is Productive of sputum. Associated symptoms include chills, nasal congestion and a sore throat. Pertinent negatives include no fever. Associated symptoms comments: Body aches, runny nose, sweats  . Treatments tried: tylenol.       Constitution: Positive for chills. Negative for fatigue and fever.   HENT:  Positive for congestion, sinus pressure and sore throat.    Respiratory:  Positive for cough.       Objective:     Physical Exam   Constitutional: She is oriented to person, place, and time. She appears well-developed. She is cooperative.  Non-toxic appearance. She does not appear ill. No distress.   HENT:   Head: Normocephalic and atraumatic.   Ears:   Right Ear: Hearing, external ear and ear canal normal. A middle ear effusion is present.   Left Ear: Hearing, external ear and ear canal normal. A middle ear effusion is present.   Nose: Congestion present. No mucosal edema, rhinorrhea or nasal deformity. No epistaxis. Right sinus exhibits frontal sinus tenderness. Right " sinus exhibits no maxillary sinus tenderness. Left sinus exhibits frontal sinus tenderness. Left sinus exhibits no maxillary sinus tenderness.   Mouth/Throat: Uvula is midline, oropharynx is clear and moist and mucous membranes are normal. No trismus in the jaw. Normal dentition. No uvula swelling. No oropharyngeal exudate, posterior oropharyngeal edema or posterior oropharyngeal erythema.   Eyes: Conjunctivae and lids are normal. No scleral icterus.   Neck: Trachea normal and phonation normal. Neck supple. No edema present. No erythema present. No neck rigidity present.   Cardiovascular: Normal rate, regular rhythm, normal heart sounds and normal pulses.   Pulmonary/Chest: Effort normal and breath sounds normal. No stridor. No respiratory distress. She has no decreased breath sounds. She has no wheezes. She has no rhonchi. She has no rales. She exhibits no tenderness.   Abdominal: Normal appearance.   Musculoskeletal: Normal range of motion.         General: No deformity. Normal range of motion.   Lymphadenopathy:     She has no cervical adenopathy.   Neurological: She is alert and oriented to person, place, and time. She exhibits normal muscle tone. Coordination normal.   Skin: Skin is warm, dry, intact, not diaphoretic and not pale.   Psychiatric: Her speech is normal and behavior is normal. Judgment and thought content normal.   Nursing note and vitals reviewed.      Assessment:     1. Acute viral sinusitis    2. Sinus congestion      Results for orders placed or performed in visit on 01/09/25   POCT Influenza A/B MOLECULAR    Collection Time: 01/09/25  6:59 PM   Result Value Ref Range    POC Molecular Influenza A Ag Negative Negative    POC Molecular Influenza B Ag Negative Negative     Acceptable Yes    SARS Coronavirus 2 Antigen, POCT Manual Read    Collection Time: 01/09/25  7:20 PM   Result Value Ref Range    SARS Coronavirus 2 Antigen Negative Negative     Acceptable Yes         Plan:       Acute viral sinusitis  -     cetirizine (ZYRTEC) 10 MG tablet; Take 1 tablet (10 mg total) by mouth once daily.  Dispense: 30 tablet; Refill: 0  -     benzonatate (TESSALON) 200 MG capsule; Take 1 capsule (200 mg total) by mouth 3 (three) times daily as needed for Cough.  Dispense: 20 capsule; Refill: 0  -     fluticasone propionate (FLONASE) 50 mcg/actuation nasal spray; 1 spray (50 mcg total) by Each Nostril route once daily.  Dispense: 16 g; Refill: 0  -     promethazine-dextromethorphan (PROMETHAZINE-DM) 6.25-15 mg/5 mL Syrp; Take 5 mLs by mouth nightly as needed (cough).  Dispense: 118 mL; Refill: 0  -     benzonatate (TESSALON) 200 MG capsule; Take 1 capsule (200 mg total) by mouth 3 (three) times daily as needed for Cough.  Dispense: 20 capsule; Refill: 0    Sinus congestion  -     POCT Influenza A/B MOLECULAR  -     SARS Coronavirus 2 Antigen, POCT Manual Read      Patient Instructions   Prescriptions sent to pharmacy:  Tessalon perles- cough suppressant- can take every 8 hours as needed for cough.     Promethazine DM- cough suppressant- take at night time as needed for cough. May cause drowsiness    Flonase- steroid nasal spray- spray in nostrils 1-2 times a day to help with runny nose and sinus congestion    Zyrtec for post nasal drip and runny nose    Please drink plenty of fluids.  Please get plenty of rest.  Nasal irrigation with a saline spray or Netti Pot like device per their directions is also recommended.  If you  smoke, please stop smoking.      If not allergic, take Tylenol (Acetaminophen) 650 mg to  1 g every 6 hours as needed  and/or Motrin (Ibuprofen) 600 to 800 mg every 6 hours as needed for fever or pain.      Please remember that you have received care at an urgent care today. Urgent cares are not emergency rooms and are not equipped to handle life threatening emergencies and cannot rule in or out certain medical conditions and you may be released before all of your  medical problems are known or treated.     Please arrange follow up with your primary care physician or speciality clinic within 2-5 days if your signs and symptoms have not resolved or worsen.     Patient can call our Referral Hotline at [phone] to make an appointment.      Please return here or go to the Emergency Department for any concerns or worsening of condition.  Signs of infection. These include a fever of 100.4°F (38°C) or higher, chills, cough, more sputum or change in color of sputum.  You are having so much trouble breathing that you can only say one or two words at a time.  You need to sit upright at all times to be able to breathe and or cannot lie down.  You have trouble breathing when talking or sitting still.  You have a fever of 100.4°F (38°C) or higher or chills.  You have chest pain when you cough, have trouble breathing but can still talk in full sentences, or cough up blood.